# Patient Record
Sex: FEMALE | Race: WHITE | NOT HISPANIC OR LATINO | Employment: UNEMPLOYED | URBAN - METROPOLITAN AREA
[De-identification: names, ages, dates, MRNs, and addresses within clinical notes are randomized per-mention and may not be internally consistent; named-entity substitution may affect disease eponyms.]

---
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
## 2018-01-10 NOTE — MISCELLANEOUS
Message   Recorded as Task   Date: 01/13/2017 08:19 AM, Created By: Aaron Lynch   Task Name: Follow Up   Assigned To: [facility] Robertson Huddle   Regarding Patient: Chuck Neves, Status: Active   Comment:    Aaron Lynch - 13 Jan 2017 8:19 AM     TASK CREATED  Caller: Malathi Rodriguez, Parent; General Medical Question; [phone]  TC from pt's mother Malathi Rodriguez that the muscle relaxer for her daughter just makes her tired but is not helping to any extent  She said that you had discussed possible Chiropractic Care at consult  they are interested in trying that first instead of the [facility]  She is asking for names of Chiropractors that you would recommend  Marlo Silva I would make Dr Sole Chester aware and c/b with his rec concerning the Tizanidine and Chiropractor  ****Per Malathi Rodriguez it is ok to leave a detailed vm with  rec as she will be at work and doesn't want to play telephone tag  C/B # [id number]  ********   Sona Alex - 13 Jan 2017 8:49 AM     TASK REPLIED TO: Previously Assigned To [facility] clinical,Team  md aware   referral to Dr Saeid Arita in [address] - 13 Jan 2017 10:03 AM     TASK EDITED  Left detailed vm for pt's mother Malathi Rodriguez that Dr Sole Chester will refer Luca Mitchell to Dr Kingsley Jones, chiropractor  I provided his office # to call to make appt and said our office will mail them a copy of the referral to Dr Saeid Arita  Chiropractor referral placed in mail today  Any questions they are to contact the office  Active Problems    1  Back pain, chronic (724 5,338 29) (M54 9,G89 29)   2  Eczema (692 9) (L30 9)   3  Encounter for vision screening (V72 0) (Z01 00)   4  Flu vaccine need (V04 81) (Z23)   5  History of allergy (V15 09) (Z88 9)   6  HLA B27 positive (V84 89) (Z15 89)   7  Low back pain (724 2) (M54 5)   8  Muscle pain, myofacial (729 1) (M79 1)   9  Neck pain (723 1) (M54 2)   10  Need for Menactra vaccination (V03 89) (Z23)   11   Need for prophylactic vaccination and inoculation against influenza (V04 81) (Z23)   12  Oral contraceptive prescribed (V25 01) (Z30 011)   13  Right leg pain (729 5) (M79 604)   14  Sacroiliitis (720 2) (M46 1)   15  Shortness of breath (786 05) (R06 02)   16  Vitamin D deficiency (268 9) (E55 9)    Current Meds   1  Desonide 0 05 % External Ointment; APPLY SPARINGLY TO AFFECTED AREA(S)   TWICE DAILY; Therapy: 16Sys7458 to (Last Rx:50Knx0353)  Requested for: 67Ywp9595 Ordered   2  Ibuprofen 400 MG Oral Tablet; TAKE 1 TABLET BY MOUTH THREE TIMES DAILY AS   NEEDED; Therapy: 13BJC2482 to (Michelle Lyn)  Requested for: 46HFN8196; Last   Rx:20Ptu8410 Ordered   3  Lidocaine 5 % External Patch; APPLY 1 - 2 PATCHES AT PAINFUL AREA 12 HOURS ON   AND 12 HOURS OFF; Therapy: 30Grh7627 to (Evaluate:23Mar2017)  Requested for: 09Erl0114; Last   Rx:08Gya4353 Ordered   4  Lo Loestrin Fe 1 MG-10 MCG / 10 MCG Oral Tablet; TAKE 1 TABLET BY MOUTH EVERY   DAY AS DIRECTED; Therapy: 87Drn7386 to (Evaluate:25Mar2017)  Requested for: 24HQX3594; Last   Rx:10Rcu0048 Ordered   5  Multi-Vitamin Oral Tablet; TAKE 1 TABLET DAILY; Therapy: 14SIF6479 to Recorded   6  Ondansetron 4 MG Oral Tablet Dispersible; TAKE 1 TABLET Every 8 hours PRN nausea; Therapy: 53CKB5801 to (Evaluate:13Jun2016)  Requested for: 53ANS7485; Last   Rx:08Jun2016 Ordered   7  Pamprin All Day Relief Max St 220 MG Oral Tablet; TAKE 1 TABLET TWICE DAILY AS   NEEDED; Therapy: 67Iup4119 to (Evaluate:13Apr2013) Recorded   8  Sulfamethoxazole-Trimethoprim 800-160 MG Oral Tablet; TAKE 1 TABLET TWICE DAILY   UNTIL FINISHED; Therapy: 52IBY5342 to (Evaluate:26Eir2383)  Requested for: 88RFE5763; Last   Rx:13May2016 Ordered   9  Transderm-Scop (1 5 MG) 1 MG/3DAYS Transdermal Patch 72 Hour; APPLY 1 PATCH   EVERY 3 DAYS #2 APPLY MORE THEN 4 HOURS BEFORE EVENT; Therapy: 11DKO0586 to (Last Rx:08Jun2016)  Requested for: 09YFL4511 Ordered   10   Vitamin D 2000 UNIT Oral Capsule; TAKE 2 CAPSULE Daily; Therapy: (Recorded:12Qem0826) to Recorded    Allergies    1  No Known Drug Allergies    2  No Known Environmental Allergies   3   No Known Food Allergies    Signatures   Electronically signed by : Clearance Bosworth, ; Jan 13 2017 10:03AM EST                       (Author)

## 2018-01-10 NOTE — MISCELLANEOUS
Message   Recorded as Task   Date: 08/11/2017 12:59 PM, Created By: Remigio White   Task Name: Call Back   Assigned To: SPA clerical,Team   Regarding Patient: Chel Prasad, Status: In Progress   Trang Hercules - 11 Aug 2017 12:59 PM     TASK CREATED  Loumiguel angel Peña had an apt today with Dr Gabriella Jameson  She wanted to set up a trigger apt for her daughter Akila Riojas 7/31/98  Patient mom says Friday or Monday apt because daughter has school  thanks   Jackie Verduzco - 11 Aug 2017 2:51 PM     TASK EDITED      Please advise  ******************   Benigno Urbina - 11 Aug 2017 4:15 PM     TASK REPLIED TO: Previously Assigned To Benigno Urbina  schedule her for Monday 8/28 @ 3:30 pm please for TPI   Connie Oconnor - 11 Aug 2017 4:24 PM     TASK EDITED    I spoke to Marcos Peña, advised above  She said that is too soon and is looking for end of September, preferrably on a Friday  States that her daughter is really nervous and not even sure she is going to go thru with it  Advised I will discuss with Dr Gabriella Jameson and [facility] Monday  Marcos Peña thankful  ****************   Benigno Urbina - 14 Aug 2017 7:13 AM     TASK REPLIED TO: Previously Assigned To Benigno Urbina  ok to schedule at end of September on a Friday   Stacie Kohli - 14 Aug 2017 10:42 AM     TASK EDITED   Pati Griffith - 14 Aug 2017 11:47 AM     TASK IN PROGRESS   Ijeoma Edwards - 14 Aug 2017 12:04 PM     TASK EDITED  pt scheduled w dr Froilan Valdivia for tpi 9-29-17        Active Problems    1  Back pain, chronic (724 5,338 29) (M54 9,G89 29)   2  Eczema (692 9) (L30 9)   3  Encounter for health maintenance examination (V70 0) (Z00 00)   4  History of allergy (V15 09) (Z88 9)   5  HLA B27 positive (V84 89) (Z15 89)   6  Low back pain (724 2) (M54 5)   7  Muscle pain, myofacial (729 1) (M79 1)   8  Neck pain (723 1) (M54 2)   9  Oral contraceptive prescribed (V25 01) (Z30 011)   10  Vitamin D deficiency (268 9) (E55 9)    Current Meds   1   Desonide 0 05 % External Ointment; APPLY SPARINGLY TO AFFECTED AREA(S)   TWICE DAILY; Therapy: 50Jou7438 to (Last Rx:40Mem9085)  Requested for: 83Dxu0556 Ordered   2  Lidocaine 5 % External Ointment; Apply to affected areas 2 to 3 times a day as needed; Therapy: 04Nzj8800 to (Evaluate:07Gjo0247)  Requested for: 81Lsn9664; Last   Rx:76Cqn5233 Ordered   3  Lidocaine 5 % External Patch; APPLY 1 - 2 PATCHES AT PAINFUL AREA 12 HOURS ON   AND 12 HOURS OFF; Therapy: 09Jgb2315 to (Evaluate:23Mar2017)  Requested for: 36Vys3763; Last   Rx:55Qms3256 Ordered   4  Lo Loestrin Fe 1 MG-10 MCG / 10 MCG Oral Tablet; TAKE 1 TABLET BY MOUTH EVERY   DAY AS DIRECTED; Therapy: 23Gaz1656 to (Evaluate:05Wex7886)  Requested for: 44Leg0037; Last   Rx:44Mci7378 Ordered   5  Meloxicam 15 MG Oral Tablet; TAKE 1 TABLET DAILY WITH FOOD; Therapy: 51Ift2787 to (Dimple Squires)  Requested for: 02Aug2017; Last   Rx:02Aug2017 Ordered   6  Multi-Vitamin Oral Tablet; TAKE 1 TABLET DAILY; Therapy: 78LDL3022 to Recorded   7  Ondansetron 4 MG Oral Tablet Disintegrating; TAKE 1 TABLET Every 8 hours PRN   nausea; Therapy: 07RIA1818 to (Evaluate:13Jun2016)  Requested for: 51BMM8534; Last   Rx:08Jun2016 Ordered   8  Pamprin All Day Relief Max St 220 MG Oral Tablet; TAKE 1 TABLET TWICE DAILY AS   NEEDED; Therapy: 08Wuq1562 to (Evaluate:13Apr2013) Recorded   9  TiZANidine HCl - 4 MG Oral Tablet; TAKE 1 TABLET AT BEDTIME; Therapy: 06Nyi1546 to (Dimple Squires)  Requested for: 93Xmf7107; Last   Rx:02Aug2017 Ordered   10  Transderm-Scop (1 5 MG) 1 MG/3DAYS Transdermal Patch 72 Hour (Scopolamine);    APPLY 1 PATCH EVERY 3 DAYS #2 APPLY MORE THEN 4 HOURS    BEFORE EVENT; Therapy: 62BTC4379 to (Last Rx:17Qby6166)  Requested for: 02Aug2017 Ordered   11  Vitamin D 2000 UNIT Oral Capsule; TAKE 2 CAPSULE Daily; Therapy: (Recorded:14Bod8842) to Recorded    Allergies    1  No Known Drug Allergies    2  No Known Environmental Allergies   3   No Known Food Allergies    Signatures   Electronically signed by : Malia Brenner, ; Aug 14 2017 12:06PM EST                       (Author)

## 2018-01-10 NOTE — MISCELLANEOUS
Message   Recorded as Task   Date: 03/22/2017 01:20 PM, Created By: Damion Don   Task Name: Med Renewal Request   Assigned To: [facility] clinical,Team   Regarding Patient: Summer Jung, Status: Active   Comment:    Rosana Martinez - 22 Mar 2017 1:20 PM     TASK CREATED  Caller: Mother, Parent; Renew Medication; [phone]  Mother called requesting a refill of tizanidine 4mg, she does not have the bottle in front of her but believes it is bid, per allscripts it is once at [facility]  Pt has an sovs w/Vidhya on 4/17/17  Mother aware Jose Grandchild will address on 3/23 when she is in the office, pt has enough for 2 days  [facility] - 23 Mar 2017 12:29 PM     TASK REPLIED TO: Previously Assigned To CELY Haney  sent to pharmacy   Rosana Martinez - 23 Mar 2017 1:32 PM     TASK EDITED  Left detailed message as per release of information  Pt/mom to call w/ any questions  Active Problems    1  Back pain, chronic (724 5,338 29) (M54 9,G89 29)   2  Eczema (692 9) (L30 9)   3  Encounter for vision screening (V72 0) (Z01 00)   4  Flu vaccine need (V04 81) (Z23)   5  History of allergy (V15 09) (Z88 9)   6  HLA B27 positive (V84 89) (Z15 89)   7  Low back pain (724 2) (M54 5)   8  Muscle pain, myofacial (729 1) (M79 1)   9  Neck pain (723 1) (M54 2)   10  Need for Menactra vaccination (V03 89) (Z23)   11  Need for prophylactic vaccination and inoculation against influenza (V04 81) (Z23)   12  Oral contraceptive prescribed (V25 01) (Z30 011)   13  Right leg pain (729 5) (M79 604)   14  Sacroiliitis (720 2) (M46 1)   15  Shortness of breath (786 05) (R06 02)   16  Vitamin D deficiency (268 9) (E55 9)    Current Meds   1  Desonide 0 05 % External Ointment; APPLY SPARINGLY TO AFFECTED AREA(S)   TWICE DAILY; Therapy: 06Ryo3031 to (Last Rx:07Une1482)  Requested for: 26Glx5494 Ordered   2   Ibuprofen 400 MG Oral Tablet; TAKE 1 TABLET BY MOUTH THREE TIMES DAILY

## 2018-01-11 NOTE — RESULT NOTES
Verified Results  (1) HLA B27 66RKR8827 04:06PM [facility] Order Number: [id number]     Test Name Result Flag Reference   HLA B27 Positive     HLA-B*27 PositiveThis patient is positive for HLA-B*27  This procedure rulesout the B*27:06 and 27:09 alleles, which the literaturesuggests are not associated with spondyloarthropathies  HLA allele interpretation for all loci based on IMGT/HLAdatabase version 3 [facility] [place] ID Number [id number] test was performed using PCR (Polymerase Chain Reaction)/SSOP(Sequence Specific Oligonucleotide Probes) technique  SBT (SequenceBased Typing) and/or SSP (Sequence Specific Primers) may be used assupplemental methods when necessary  Please contact HLA CustomerService at [phone] if you have any questions   Director of [facility] Dr Vic Wolfe, PhD    Performed at:  [address]  [id number]  : Paris Schroeder PhD, Phone:  [phone]

## 2018-01-11 NOTE — RESULT NOTES
Verified Results  (1) RHEUMATOID FACTOR SCREEN 45AIB1726 04:06PM Neema Perkins Order Number: [id number]     Test Name Result Flag Reference   RHEUMATOID FACTOR Negative  Negative       Discussion/Summary   RHEUMATOID NEGATIVE     Pippa Rudd

## 2018-01-11 NOTE — RESULT NOTES
Verified Results  (1) CBC/PLT/DIFF 24VZO8891 04:06PM Roxine Cellrox Order Number: [id number]  TW Order Number: [id number]     Test Name Result Flag Reference   WBC COUNT 6 96 Thousand/uL  4 31-10 16   RBC COUNT 4 94 Million/uL  3 81-5 12   HEMOGLOBIN 14 9 g/dL  11 5-15 4   HEMATOCRIT 42 4 %  34 8-46  1   MCV 86 fL  82-98   MCH 30 2 pg  26 8-34 3   MCHC 35 1 g/dL  31 4-37 4   RDW 11 7 %  11 6-15 1   MPV 9 1 fL  8 9-12 7   PLATELET COUNT 870 Thousands/uL  149-390   NEUTROPHILS RELATIVE PERCENT 58 %  43-75   LYMPHOCYTES RELATIVE PERCENT 31 %  14-44   MONOCYTES RELATIVE PERCENT 6 %  4-12   EOSINOPHILS RELATIVE PERCENT 4 %  0-6   BASOPHILS RELATIVE PERCENT 1 %  0-1   NEUTROPHILS ABSOLUTE COUNT 4 06 Thousands/?L  1 85-7 62   LYMPHOCYTES ABSOLUTE COUNT 2 17 Thousands/?L  0 60-4 47   MONOCYTES ABSOLUTE COUNT 0 39 Thousand/?L  0 17-1 22   EOSINOPHILS ABSOLUTE COUNT 0 28 Thousand/?L  0 00-0 61   BASOPHILS ABSOLUTE COUNT 0 06 Thousands/?L  0 00-0 10     (1) C-REACTIVE PROTEIN 00UZQ8160 04:06PM Roxine Cellrox Order Number: [id number]  TW Order Number: [id number]     Test Name Result Flag Reference   C-REACT PROTEIN 3 2 mg/L H <3 0     (1) SED RATE [id number] 04:06PM Roxine Cellrox Order Number: [id number]     Test Name Result Flag Reference   SED RATE 9 mm/hour  0-20     (1) VITAMIN D 25-HYDROXY 65PQM7954 04:06PM Roxine Cellrox Order Number: [id number]  TW Order Number: [id number]     Test Name Result Flag Reference   VIT D 25-HYDROX 26 8 ng/mL L 30 0-100 0

## 2018-01-12 NOTE — PROGRESS NOTES
Assessment    1  Encounter for preventive health examination (V70 0) (Z00 00)   2  Never a smoker    Plan  Muscle pain, myofacial    · TiZANidine HCl - 4 MG Oral Tablet; TAKE 1 TABLET AT BEDTIME  Oral contraceptive prescribed    · Lo Loestrin Fe 1 MG-10 MCG / 10 MCG Oral Tablet; TAKE 1 TABLET BY MOUTH  EVERY DAY AS DIRECTED  PMH: History of inflammation of sacroiliac joint    · Meloxicam 15 MG Oral Tablet; TAKE 1 TABLET DAILY WITH FOOD  PMH: Motion sickness, initial encounter    · Transderm-Scop (1 5 MG) 1 MG/3DAYS Transdermal Patch 72 Hour  (Scopolamine); APPLY 1 PATCH EVERY 3 DAYS #2 APPLY MORE THEN 4  HOURS BEFORE EVENT    Discussion/Summary  health maintenance visit Currently, she eats a healthy diet  the risks and benefits of cervical cancer screening were discussed cervical cancer screening is not indicated Breast cancer screening: the risks and benefits of breast cancer screening were discussed and breast cancer screening is not indicated  Colorectal cancer screening: the risks and benefits of colorectal cancer screening were discussed and colorectal cancer screening is not indicated  Osteoporosis screening: the risks and benefits of osteoporosis screening were discussed, bone mineral density testing is not indicated and bone mineral density testing is managed by  Advice and education were given regarding weight loss, sunscreen use and seat belt use  Patient discussion: discussed with the patient  Possible side effects of new medications were reviewed with the patient/guardian today  The treatment plan was reviewed with the patient/guardian  The patient/guardian understands and agrees with the treatment plan      Chief Complaint  Patient presents to office with mother for a health maintenance exam       History of Present Illness  HM, Adult Female: The patient is being seen for a health maintenance evaluation  Social History: Household members include mother and aunt, uncle and 2 cousins   She is unmarried  Work status: student  The patient has never smoked cigarettes  She reports never drinking alcohol  She has never used illicit drugs  General Health: The patient's health since the last visit is described as good  She has regular dental visits  Lifestyle:  She consumes a diverse and healthy diet  She does not have any weight concerns  She exercises regularly  She does not use tobacco  She denies alcohol use  She denies drug use  Reproductive health:  she reports normal menses  she uses no contraception  she is not sexually active  she denies prior pregnancies  Screening: Cervical cancer screening includes no previous pap smear, no previous human papilloma virus screening and no previous colposcopy  Breast cancer screening includes no previous mammogram, no previous clinical breast exam and no breast self-exams  She hasn't been previously screened for colorectal cancer  Metabolic screening includes lipid profile performed within the past five years, glucose screening performed last year, thyroid function test performed last year and no previous DEXA  Safety elements used: seat belt and smoke detector  HPI: here for wellness exam      Review of Systems    Constitutional: No fever, no chills, feels well, no tiredness, no recent weight gain or weight loss  Eyes: No complaints of eye pain, no red eyes, no eyesight problems, no discharge, no dry eyes, no itching of eyes  ENT: no complaints of earache, no loss of hearing, no nose bleeds, no nasal discharge, no sore throat, no hoarseness  Cardiovascular: No complaints of slow heart rate, no fast heart rate, no chest pain, no palpitations, no leg claudication, no lower extremity edema  Respiratory: No complaints of shortness of breath, no wheezing, no cough, no SOB on exertion, no orthopnea, no PND  Gastrointestinal: No complaints of abdominal pain, no constipation, no nausea or vomiting, no diarrhea, no bloody stools     Genitourinary: No complaints of dysuria, no incontinence, no pelvic pain, no dysmenorrhea, no vaginal discharge or bleeding  Musculoskeletal: No complaints of arthralgias, no myalgias, no joint swelling or stiffness, no limb pain or swelling  Integumentary: No complaints of skin rash or lesions, no itching, no skin wounds, no breast pain or lump  Neurological: No complaints of headache, no confusion, no convulsions, no numbness, no dizziness or fainting, no tingling, no limb weakness, no difficulty walking  Psychiatric: Not suicidal, no sleep disturbance, no anxiety or depression, no change in personality, no emotional problems  Endocrine: No complaints of proptosis, no hot flashes, no muscle weakness, no deepening of the voice, no feelings of weakness  Hematologic/Lymphatic: No complaints of swollen glands, no swollen glands in the neck, does not bleed easily, does not bruise easily  Active Problems    1  Back pain, chronic (724 5,338 29) (M54 9,G89 29)   2  Eczema (692 9) (L30 9)   3  History of allergy (V15 09) (Z88 9)   4  HLA B27 positive (V84 89) (Z15 89)   5  Low back pain (724 2) (M54 5)   6  Muscle pain, myofacial (729 1) (M79 1)   7  Neck pain (723 1) (M54 2)   8  Oral contraceptive prescribed (V25 01) (Z30 011)   9   Vitamin D deficiency (268 9) (E55 9)    Past Medical History    · History of Acute back pain (724 5) (M54 9)   · History of Acute UTI (599 0) (N39 0)   · History of Encounter for vision screening (V72 0) (Z01 00)   · History of dysmenorrhea (V13 29) (Z87 42)   · History of eczema (V13 3) (Z87 2)   · History of inflammation of sacroiliac joint (V13 4) (Z87 39)   · History of influenza vaccination (V49 89) (Z92 29)   · History of influenza vaccination (V49 89) (Z92 29)   · History of shortness of breath (V13 89) (Z16 683)   · History of urinary frequency (V13 09) (Z87 898)   · History of Motion sickness, initial encounter (994 6) (T75 3XXA)   · History of Need for Menactra vaccination (V03 89) (Z23)   · History of Pain, upper back (724 5) (M54 9)   · History of Pneumonia (V12 61)   · History of Right leg pain (729 5) (M79 604)    Family History  Mother    · Family history of Family Health Status Of Mother - Alive  Father    · Family history of Family Health Status Of Father - Alive   · Family history of Hypertension (V17 49)   · Family history of Pure Hypercholesterolemia  Paternal Grandmother    · Family history of Asthma (V17 5)  Family History    · Family history of Arthritis (716 90) (M19 90)    Social History    · Denied: History of Being A Social Drinker   · Daily caffeinated cola consumption   · Educational Level - Grade 8 Completed   · Never a smoker    Current Meds   1  Desonide 0 05 % External Ointment; APPLY SPARINGLY TO AFFECTED AREA(S)   TWICE DAILY; Therapy: 39Cjh3185 to (Last Rx:37Pre3209)  Requested for: 91Yrh4444 Ordered   2  Lidocaine 5 % External Ointment; Apply to affected areas 2 to 3 times a day as needed; Therapy: 62Pux2758 to (Evaluate:57Ibw0200)  Requested for: 50Cyg5672; Last   Rx:17Apr2017 Ordered   3  Lidocaine 5 % External Patch; APPLY 1 - 2 PATCHES AT PAINFUL AREA 12 HOURS ON   AND 12 HOURS OFF; Therapy: 88Lfm3420 to (Evaluate:23Mar2017)  Requested for: 73Jbc1301; Last   Rx:57Vht8402 Ordered   4  Lo Loestrin Fe 1 MG-10 MCG / 10 MCG Oral Tablet; TAKE 1 TABLET BY MOUTH EVERY   DAY AS DIRECTED; Therapy: 65Vls8779 to (Evaluate:86Sem7707)  Requested for: 99OLN7606; Last   DV:41NYH4852 Ordered   5  Meloxicam 15 MG Oral Tablet; TAKE 1 TABLET DAILY WITH FOOD; Therapy: 68Myz8266 to (Evaluate:09Zfo4504)  Requested for: 23Yjy2827; Last   Rx:72Ymx5135 Ordered   6  Multi-Vitamin Oral Tablet; TAKE 1 TABLET DAILY; Therapy: 94ANC2491 to Recorded   7  Ondansetron 4 MG Oral Tablet Disintegrating; TAKE 1 TABLET Every 8 hours PRN   nausea; Therapy: 63FWT7502 to (Evaluate:13Jun2016)  Requested for: 25JCU7031; Last   Rx:08Jun2016 Ordered   8   Pamprin All Day Relief Max St 220 MG Oral Tablet; TAKE 1 TABLET TWICE DAILY AS   NEEDED; Therapy: 88Mmv7477 to (Evaluate:13Apr2013) Recorded   9  TiZANidine HCl - 4 MG Oral Tablet; TAKE 1 TABLET AT BEDTIME; Therapy: 26Qdq1750 to (Evaluate:21Jun2017)  Requested for: 35CLY3122; Last   Rx:23Mar2017 Ordered   10  Transderm-Scop (1 5 MG) 1 MG/3DAYS Transdermal Patch 72 Hour; APPLY 1 PATCH    EVERY 3 DAYS #2 APPLY MORE THEN 4 HOURS BEFORE EVENT; Therapy: 77SKF1795 to (Last Rx:08Jun2016)  Requested for: 95PIM9234 Ordered   11  Vitamin D 2000 UNIT Oral Capsule; TAKE 2 CAPSULE Daily; Therapy: (Recorded:19Cng8059) to Recorded    Allergies    1  No Known Drug Allergies    2  No Known Environmental Allergies   3  No Known Food Allergies    Vitals   Recorded: 07Hhg1749 03:27PM   Heart Rate 88   Respiration 16   Systolic 585   Diastolic 80   Height 5 ft 4 02 in   Weight 136 lb 12 8 oz   BMI Calculated 23 47   BSA Calculated 1 66   BMI Percentile 69 %   2-20 Stature Percentile 46 %   2-20 Weight Percentile 67 %     Physical Exam    Constitutional   General appearance: No acute distress, well appearing and well nourished  Head and Face   Head and face: Normal     Eyes   Conjunctiva and lids: No swelling, erythema or discharge  Pupils and irises: Equal, round, reactive to light  Ears, Nose, Mouth, and Throat   External inspection of ears and nose: Normal     Otoscopic examination: Tympanic membranes translucent with normal light reflex  Canals patent without erythema  Nasal mucosa, septum, and turbinates: Normal without edema or erythema  Lips, teeth, and gums: Normal, good dentition  Oropharynx: Normal with no erythema, edema, exudate or lesions  Neck   Neck: Supple, symmetric, trachea midline, no masses  Thyroid: Normal, no thyromegaly  Pulmonary   Respiratory effort: No increased work of breathing or signs of respiratory distress  Auscultation of lungs: Clear to auscultation      Cardiovascular   Auscultation of heart: Normal rate and rhythm, normal S1 and S2, no murmurs  Examination of extremities for edema and/or varicosities: Normal     Abdomen   Abdomen: Non-tender, no masses  Liver and spleen: No hepatomegaly or splenomegaly  Lymphatic   Palpation of lymph nodes in neck: No lymphadenopathy  Palpation of lymph nodes in axillae: No lymphadenopathy  Musculoskeletal   Gait and station: Normal     Digits and nails: Normal without clubbing or cyanosis  Joints, bones, and muscles: Normal     Range of motion: Normal     Stability: Normal     Muscle strength/tone: Normal     Skin   Skin and subcutaneous tissue: Normal without rashes or lesions  Palpation of skin and subcutaneous tissue: Normal turgor  Neurologic   Cranial nerves: Cranial nerves II-XII intact  Cortical function: Normal mental status  Reflexes: 2+ and symmetric  Sensation: No sensory loss  Coordination: Normal finger to nose and heel to shin  Psychiatric   Judgment and insight: Normal     Orientation to person, place, and time: Normal     Recent and remote memory: Intact  Mood and affect: Normal        Results/Data  PHQ-2 Adult Depression Screening 98Dtf9397 03:25PM User, s     Test Name Result Flag Reference   PHQ-2 Adult Depression Score 0     Over the last two weeks, how often have you been bothered by any of the following problems? Little interest or pleasure in doing things: Not at all - 0  Feeling down, depressed, or hopeless: Not at all - 0   PHQ-2 Adult Depression Screening Negative         Signatures   Electronically signed by : IOANA Boyce;  Aug  2 2017  7:01PM EST                       (Author)    Electronically signed by : Jennifer Andrade MD; Aug  3 2017  9:44AM EST                       (Author)

## 2018-01-12 NOTE — RESULT NOTES
Verified Results  * XR SPINE CERVICAL 2 OR 3 VIEW 42DYK8806 03:58PM Kalen Rodarte Order Number: [id number]     Test Name Result Flag Reference   XR SPINE CERVICAL 2 OR 3 VW (Report)     CERVICAL SPINE     INDICATION: Neck pain  COMPARISON: None     VIEWS: AP, lateral and open mouth projections; 4 images     FINDINGS:     No evidence of fracture or subluxation  The intervertebral disc spaces are preserved  The prevertebral soft tissues are within normal limits  The lung apices are intact  IMPRESSION:     Unremarkable cervical spine         Workstation performed: [id number]     Signed by:   Moon Aleman MD   5/4/16

## 2018-01-13 VITALS
BODY MASS INDEX: 23.35 KG/M2 | HEART RATE: 88 BPM | SYSTOLIC BLOOD PRESSURE: 110 MMHG | WEIGHT: 136.8 LBS | DIASTOLIC BLOOD PRESSURE: 80 MMHG | HEIGHT: 64 IN | RESPIRATION RATE: 16 BRPM

## 2018-01-13 NOTE — RESULT NOTES
Verified Results  (1) GRACIA SCREEN W/REFLEX TO TITER/PATTERN 55WDT7908 04:06PM Joseline Shows Order Number: [id number]     Test Name Result Flag Reference   GRACIA SCREEN  Negative  Negative       Discussion/Summary   LUPUS SCREEN IS NORMAL      [facility] [place]

## 2018-01-13 NOTE — MISCELLANEOUS
Message   Recorded as Task   Date: 03/21/2017 11:31 AM, Created By: Kristina Barragan   Task Name: Miscellaneous   Assigned To: Kristina Barragan   Regarding Patient: Rufina Van, Status: Active   CommentCheril Servando - 21 Mar 2017 11:31 AM     TASK CREATED  pt rescheduled her [address] tomorrow   Waqas Ewing - 21 Mar 2017 11:43 AM     TASK REPLIED TO: Previously Assigned To Waqas Ewing md aware     Signatures   Electronically signed by :  Ahmet Martell, ; Mar 22 2017  8:05AM EST                       (Author)

## 2018-01-14 VITALS
HEIGHT: 64 IN | HEART RATE: 96 BPM | WEIGHT: 135 LBS | BODY MASS INDEX: 23.05 KG/M2 | DIASTOLIC BLOOD PRESSURE: 74 MMHG | RESPIRATION RATE: 20 BRPM | SYSTOLIC BLOOD PRESSURE: 118 MMHG

## 2018-01-15 NOTE — PROGRESS NOTES
Chief Complaint  pt here for flu shot      Active Problems    1  Acute back pain (724 5) (M54 9)   2  Acute UTI (599 0) (N39 0)   3  Back pain, chronic (724 5,338 29) (M54 9,G89 29)   4  Eczema (692 9) (L30 9)   5  Encounter for vision screening (V72 0) (Z01 00)   6  History of allergy (V15 09) (Z88 9)   7  Low back pain (724 2) (M54 5)   8  Motion sickness, initial encounter (994 6) (T75 3XXA)   9  Neck pain (723 1) (M54 2)   10  Need for Menactra vaccination (V03 89) (Z23)   11  Need for prophylactic vaccination and inoculation against influenza (V04 81) (Z23)   12  Oral contraceptive prescribed (V25 01) (Z30 011)   13  Right leg pain (729 5) (M79 604)   14  Shortness of breath (786 05) (R06 02)   15  Urinary frequency (788 41) (R35 0)   16  Vitamin D deficiency (268 9) (E55 9)    Current Meds   1  Desonide 0 05 % External Ointment; APPLY SPARINGLY TO AFFECTED AREA(S)   TWICE DAILY; Therapy: 75Csd0263 to (Last Rx:42Dkg8632)  Requested for: 08Sug5038 Ordered   2  Ibuprofen 400 MG Oral Tablet; TAKE 1 TABLET BY MOUTH THREE TIMES DAILY AS   NEEDED; Therapy: 17PRW3187 to (21 )  Requested for: 82MLK1951; Last   Rx:22Cni7869 Ordered   3  Lo Loestrin Fe 1 MG-10 MCG / 10 MCG Oral Tablet; TAKE 1 TABLET BY MOUTH EVERY   DAY AS DIRECTED; Therapy: 68Smz9902 to (Evaluate:64Fli5132)  Requested for: 27Jun2016; Last   Rx:27Jun2016 Ordered   4  Metaxalone 800 MG Oral Tablet; TAKE 1 TABLET 3 TIMES DAILY AS NEEDED FOR   MUSCLE SPASM; Therapy: 82RIX4388 to (Evaluate:22Pwa2109)  Requested for: 81LJU9875; Last   Rx:38Rzc8715 Ordered   5  Multi-Vitamin Oral Tablet; TAKE 1 TABLET DAILY; Therapy: 07ZRG7570 to Recorded   6  Ondansetron 4 MG Oral Tablet Dispersible; TAKE 1 TABLET Every 8 hours PRN nausea; Therapy: 79HNF4748 to (Evaluate:13Jun2016)  Requested for: 69SDC1234; Last   Rx:08Jun2016 Ordered   7  Pamprin All Day Relief Max St 220 MG Oral Tablet; TAKE 1 TABLET TWICE DAILY AS   NEEDED;    Therapy: 92GEQ2916 to (Evaluate:13Apr2013) Recorded   8  Sulfamethoxazole-Trimethoprim 800-160 MG Oral Tablet; TAKE 1 TABLET TWICE DAILY   UNTIL FINISHED; Therapy: 11MCN0810 to (Evaluate:18May2016)  Requested for: 50OFV8658; Last   Rx:13May2016 Ordered   9  Transderm-Scop (1 5 MG) 1 MG/3DAYS Transdermal Patch 72 Hour; APPLY 1 PATCH   EVERY 3 DAYS #2 APPLY MORE THEN 4 HOURS BEFORE EVENT; Therapy: 18ROQ9137 to (Last Rx:08Jun2016)  Requested for: 04QXZ6993 Ordered   10  Vitamin D 2000 UNIT Oral Capsule; TAKE 2 CAPSULE Daily; Therapy: (Recorded:06May2016) to Recorded    Allergies    1  No Known Drug Allergies    2  No Known Environmental Allergies   3   No Known Food Allergies    Plan  Flu vaccine need    · Fluzone Quadrivalent 0 5 ML Intramuscular Suspension Prefilled Syringe    Signatures   Electronically signed by : Nataliya Wiley DO; Nov 8 2016  4:26PM EST                       (Author)

## 2018-01-15 NOTE — RESULT NOTES
Verified Results  (1) URINE CULTURE 36XIO8969 07:52PM Richa Lambert     Test Name Result Flag Reference   CLINICAL REPORT (Report)     Test:        Urine culture  Specimen Type:   Urine  Specimen Date:   5/13/2016 7:52 PM  Result Date:    5/14/2016 5:45 PM  Result Status:   Final result  Resulting Lab:    [address] [address]            Tel: [phone]      CULTURE                                       ------------------                                   No Growth <1000 cfu/mL       Signatures   Electronically signed by : IOANA Mart; May 16 2016  6:26AM EST                       (Author)

## 2018-01-15 NOTE — RESULT NOTES
Message   Recorded as Task   Date: 05/19/2017 11:15 AM, Created By: Shola Mcgowan   Task Name: Follow Up   Assigned To: Pavel Jose   Regarding Patient: Елена Richardson, Status: Active   Comment:    SeunStacie - 19 May 2017 11:15 AM     TASK CREATED  Results Inquiry  Received a call from Sammie's mother stating her daughter tried to get the [place] filled and it is not covered by their insurance and they can't afford the $200  Do you think she should go back on the Tizanidine? FQ to advise  sophia Beckman - 19 May 2017 12:31 PM     TASK REPLIED TO: Previously Assigned To SPA Ivan Najjar  will try the generic   Stacie Kohli - 19 May 2017 2:27 PM     TASK EDITED  S/w mother Ranjana Yu and she was made a ware          Signatures   Electronically signed by : Nancy Galvan, ; May 19 2017  2:28PM EST                       (Author)

## 2018-01-16 NOTE — MISCELLANEOUS
Message     Recorded as Task   Date: 04/17/2017 03:48 PM, Created By: Paz Wright   Task Name: Follow Up   Assigned To: CELY Valencia   Regarding Patient: Héctor North, Status: Active   Comment:    Lili Werner - 17 Apr 2017 3:48 PM     TASK CREATED  Caller: Frank Fernandez, Parent; General Medical Question  **FYI**    TC from pt's mother stating that their [facility] contacted them that the meloxicam was ready but said they didn't get any order for Lidocaine oint from our office when Mainor Black inquired about it  They [facility] on [address]  Melva Tierney I would look into and call their [facility] with verbal clarification  Per allscripts the lidocaine oint order reads verification of transmission from 11:31 this AM, s/w female pharmacist at [facility] who said order wasn't received on their end  I gave order verbally to pharmacist for the lidocaine oint just as NM ordered it in allscript  Vidhya Mckeon - 17 Apr 2017 5:06 PM     TASK REPLIED TO: Previously Assigned To Jacky Alfaro clinical,Team  thanks so much        Active Problems    1  Back pain, chronic (724 5,338 29) (M54 9,G89 29)   2  Eczema (692 9) (L30 9)   3  Encounter for vision screening (V72 0) (Z01 00)   4  Flu vaccine need (V04 81) (Z23)   5  History of allergy (V15 09) (Z88 9)   6  HLA B27 positive (V84 89) (Z15 89)   7  Low back pain (724 2) (M54 5)   8  Muscle pain, myofacial (729 1) (M79 1)   9  Neck pain (723 1) (M54 2)   10  Need for Menactra vaccination (V03 89) (Z23)   11  Need for prophylactic vaccination and inoculation against influenza (V04 81) (Z23)   12  Oral contraceptive prescribed (V25 01) (Z30 011)   13  Right leg pain (729 5) (M79 604)   14  Sacroiliitis (720 2) (M46 1)   15  Shortness of breath (786 05) (R06 02)   16  Vitamin D deficiency (268 9) (E55 9)    Current Meds   1  Desonide 0 05 % External Ointment; APPLY SPARINGLY TO AFFECTED AREA(S)   TWICE DAILY;    Therapy: 14PGH5149 to (Last Rx:24Feb2015) Requested for: 82Rbz7011 Ordered   2  Lidocaine 5 % External Ointment; Apply to affected areas 2 to 3 times a day as needed; Therapy: 99Uuh1397 to (Evaluate:66Bnf4850)  Requested for: 86Qua5033; Last   Rx:07Qpg9159 Ordered   3  Lidocaine 5 % External Patch; APPLY 1 - 2 PATCHES AT PAINFUL AREA 12 HOURS ON   AND 12 HOURS OFF; Therapy: 76Dfh9876 to (Evaluate:23Mar2017)  Requested for: 63Yti7486; Last   Rx:10Qds0671 Ordered   4  Lo Loestrin Fe 1 MG-10 MCG / 10 MCG Oral Tablet; TAKE 1 TABLET BY MOUTH EVERY   DAY AS DIRECTED; Therapy: 45Lnh7201 to (Evaluate:30Apr2017)  Requested for: 02Apr2017; Last   Rx:02Apr2017 Ordered   5  Meloxicam 15 MG Oral Tablet; TAKE 1 TABLET DAILY WITH FOOD; Therapy: 82Qcj4729 to (Evaluate:83Pce7522)  Requested for: 61Sjf2048; Last   Rx:60Tjz5779 Ordered   6  Multi-Vitamin Oral Tablet; TAKE 1 TABLET DAILY; Therapy: 39KMD3532 to Recorded   7  Ondansetron 4 MG Oral Tablet Dispersible; TAKE 1 TABLET Every 8 hours PRN nausea; Therapy: 90ETO9884 to (Evaluate:13Jun2016)  Requested for: 23UFM8476; Last   Rx:08Jun2016 Ordered   8  Pamprin All Day Relief Max St 220 MG Oral Tablet; TAKE 1 TABLET TWICE DAILY AS   NEEDED; Therapy: 25Nxp5898 to (Evaluate:13Apr2013) Recorded   9  Sulfamethoxazole-Trimethoprim 800-160 MG Oral Tablet; TAKE 1 TABLET TWICE DAILY   UNTIL FINISHED; Therapy: 93TGA0619 to (Evaluate:04Pqi4850)  Requested for: 92LZH1565; Last   Rx:72Slh2671 Ordered   10  TiZANidine HCl - 4 MG Oral Tablet; TAKE 1 TABLET AT BEDTIME; Therapy: 37Aut5679 to (Evaluate:21Jun2017)  Requested for: 15IKP4286; Last    Rx:23Mar2017 Ordered   11  Transderm-Scop (1 5 MG) 1 MG/3DAYS Transdermal Patch 72 Hour; APPLY 1 PATCH    EVERY 3 DAYS #2 APPLY MORE THEN 4 HOURS BEFORE EVENT; Therapy: 72BDY5850 to (Last Rx:08Jun2016)  Requested for: 30UIB4710 Ordered   12  Vitamin D 2000 UNIT Oral Capsule; TAKE 2 CAPSULE Daily; Therapy: (Recorded:64Wlf3707) to Recorded    Allergies    1   No Known Drug Allergies    2  No Known Environmental Allergies   3   No Known Food Allergies    Signatures   Electronically signed by : Delio Amador, ; Apr 18 2017  7:37AM EST                       (Author)

## 2018-01-16 NOTE — MISCELLANEOUS
Message   Recorded as Task   Date: 02/22/2017 09:27 AM, Created By: Marianna Martines   Task Name: Miscellaneous   Assigned To: Marianna Martines   Regarding Patient: Sukh Monae, Status: Active   Comment:    Marianna Martines - 22 Feb 2017 9:27 AM     TASK CREATED  Caller: Self; [phone] (Home); [phone] (Work)  Patient and her mom called on speaker today  They flip flopped there appts  The daughter got a tattoo on her abdomen and can't have any clothing touching in bc it hurts too much  (Just an Dwight Pointer - 22 Feb 2017 10:21 AM     TASK REPLIED TO: Previously Assigned To Heron [facility] md aware        Active Problems    1  Back pain, chronic (724 5,338 29) (M54 9,G89 29)   2  Eczema (692 9) (L30 9)   3  Encounter for vision screening (V72 0) (Z01 00)   4  Flu vaccine need (V04 81) (Z23)   5  History of allergy (V15 09) (Z88 9)   6  HLA B27 positive (V84 89) (Z15 89)   7  Low back pain (724 2) (M54 5)   8  Muscle pain, myofacial (729 1) (M79 1)   9  Neck pain (723 1) (M54 2)   10  Need for Menactra vaccination (V03 89) (Z23)   11  Need for prophylactic vaccination and inoculation against influenza (V04 81) (Z23)   12  Oral contraceptive prescribed (V25 01) (Z30 011)   13  Right leg pain (729 5) (M79 604)   14  Sacroiliitis (720 2) (M46 1)   15  Shortness of breath (786 05) (R06 02)   16  Vitamin D deficiency (268 9) (E55 9)    Current Meds   1  Desonide 0 05 % External Ointment; APPLY SPARINGLY TO AFFECTED AREA(S)   TWICE DAILY; Therapy: 37Sha5599 to (Last Rx:61Ayd8181)  Requested for: 86Irv6833 Ordered   2  Ibuprofen 400 MG Oral Tablet; TAKE 1 TABLET BY MOUTH THREE TIMES DAILY AS   NEEDED; Therapy: 57JRD7920 to (96 [id number])  Requested for: 13QHJ7471; Last   Rx:47Pri1158 Ordered   3  Lidocaine 5 % External Patch; APPLY 1 - 2 PATCHES AT PAINFUL AREA 12 HOURS ON   AND 12 HOURS OFF;    Therapy: 03Ona1348 to (Evaluate:23Mar2017)  Requested for: 23Dec2016; Last   Rx:23Dec2016 Ordered 4  Lo Loestrin Fe 1 MG-10 MCG / 10 MCG Oral Tablet; TAKE 1 TABLET BY MOUTH EVERY   DAY AS DIRECTED; Therapy: 57Aaa7566 to (Evaluate:25Mar2017)  Requested for: 89XNF5757; Last   Rx:46Wvc2297 Ordered   5  Multi-Vitamin Oral Tablet; TAKE 1 TABLET DAILY; Therapy: 50IKX4377 to Recorded   6  Ondansetron 4 MG Oral Tablet Dispersible; TAKE 1 TABLET Every 8 hours PRN nausea; Therapy: 05TTF4661 to (Evaluate:13Jun2016)  Requested for: 97WXX6193; Last   Rx:08Jun2016 Ordered   7  Pamprin All Day Relief Max St 220 MG Oral Tablet; TAKE 1 TABLET TWICE DAILY AS   NEEDED; Therapy: 71Pqa7148 to (Evaluate:13Apr2013) Recorded   8  Sulfamethoxazole-Trimethoprim 800-160 MG Oral Tablet; TAKE 1 TABLET TWICE DAILY   UNTIL FINISHED; Therapy: 93SDC4449 to (Evaluate:18May2016)  Requested for: 82LHG3367; Last   Rx:13May2016 Ordered   9  Transderm-Scop (1 5 MG) 1 MG/3DAYS Transdermal Patch 72 Hour; APPLY 1 PATCH   EVERY 3 DAYS #2 APPLY MORE THEN 4 HOURS BEFORE EVENT; Therapy: 74TWZ9739 to (Last Rx:08Jun2016)  Requested for: 81NMC5151 Ordered   10  Vitamin D 2000 UNIT Oral Capsule; TAKE 2 CAPSULE Daily; Therapy: (Recorded:23Fij6342) to Recorded    Allergies    1  No Known Drug Allergies    2  No Known Environmental Allergies   3   No Known Food Allergies    Signatures   Electronically signed by : Airam Bowling, ; Feb 22 2017 10:47AM EST                       (Author)

## 2018-01-16 NOTE — MISCELLANEOUS
Message   Recorded as Task   Date: 05/10/2017 12:25 PM, Created By: Elijah Quiroga   Task Name: Miscellaneous   Assigned To: Abbie Causey clinical,Team   Regarding Patient: Deanne Blanco, Status: Active   Comment:    DeanindioCarlota hammond - 10 May 2017 12:25 PM     TASK CREATED  Pt's mother Wendy Epps called stating pt was given Lorzone 750mg to take and let the office know if they would like a script  Wendy Epps said they would like a script sent to [facility] (she said [facility] location is [facility] chart)  Wendy Epps can be reached at [phone]  [facility] - 10 May 2017 1:03 PM     TASK EDITED  I confirmed with pt's mother that her daughter was taking lorzone 750mg 1/2 tab during the day and full tab QHS  She took the last pill this morning and has non left for tonight  Asking for RX be sent to [facility] on [address] on file today  ****She said their [facility] will contact them once the RX is ready, no need to c/b once sent to pharmacy  ****   Brenna Gordillo - 10 May 2017 1:41 PM     TASK REPLIED TO: Previously Assigned To Brenna Gordillo  e-rx sent for Kandice Dawson - 10 May 2017 1:56 PM     TASK EDITED  Mother Linden Brizuela aware  Active Problems    1  Back pain, chronic (724 5,338 29) (M54 9,G89 29)   2  Eczema (692 9) (L30 9)   3  Encounter for vision screening (V72 0) (Z01 00)   4  Flu vaccine need (V04 81) (Z23)   5  History of allergy (V15 09) (Z88 9)   6  HLA B27 positive (V84 89) (Z15 89)   7  Low back pain (724 2) (M54 5)   8  Muscle pain, myofacial (729 1) (M79 1)   9  Neck pain (723 1) (M54 2)   10  Need for Menactra vaccination (V03 89) (Z23)   11  Need for prophylactic vaccination and inoculation against influenza (V04 81) (Z23)   12  Oral contraceptive prescribed (V25 01) (Z30 011)   13  Right leg pain (729 5) (M79 604)   14  Sacroiliitis (720 2) (M46 1)   15  Shortness of breath (786 05) (R06 02)   16  Vitamin D deficiency (268 9) (E55 9)    Current Meds   1   Desonide 0 05 % External Ointment; APPLY SPARINGLY TO AFFECTED AREA(S)   TWICE DAILY; Therapy: 06Ora4329 to (Last Rx:19Mpy9172)  Requested for: 24Kfw4670 Ordered   2  Lidocaine 5 % External Ointment; Apply to affected areas 2 to 3 times a day as needed; Therapy: 15Iqr3942 to (Evaluate:61Vkg6381)  Requested for: 57Otg9479; Last   Rx:25Bre9108 Ordered   3  Lidocaine 5 % External Patch; APPLY 1 - 2 PATCHES AT PAINFUL AREA 12 HOURS ON   AND 12 HOURS OFF; Therapy: 41Xha6445 to (Evaluate:23Mar2017)  Requested for: 88Gya2368; Last   Rx:18Ktk1990 Ordered   4  Lo Loestrin Fe 1 MG-10 MCG / 10 MCG Oral Tablet; TAKE 1 TABLET BY MOUTH EVERY   DAY AS DIRECTED; Therapy: 70Riu5059 to (Evaluate:93Dqj9060)  Requested for: 56LHI9298; Last   OE:24IYB5232 Ordered   5  Lorzone 750 MG Oral Tablet; TAKE 1 TABLET Twice daily PRN muscle spasm; Therapy: 73Mcr9821 to (Evaluate:28Qgd3457)  Requested for: 67OCB8812; Last   Rx:81Eqd8428 Ordered   6  Meloxicam 15 MG Oral Tablet; TAKE 1 TABLET DAILY WITH FOOD; Therapy: 59Khu4945 to (Evaluate:49Ojg0256)  Requested for: 92Bxq9617; Last   Rx:24Ikp7571 Ordered   7  Multi-Vitamin Oral Tablet; TAKE 1 TABLET DAILY; Therapy: 49NFG1031 to Recorded   8  Ondansetron 4 MG Oral Tablet Dispersible; TAKE 1 TABLET Every 8 hours PRN nausea; Therapy: 46RVI4805 to (Evaluate:13Jun2016)  Requested for: 24OEF9603; Last   Rx:08Jun2016 Ordered   9  Pamprin All Day Relief Max St 220 MG Oral Tablet; TAKE 1 TABLET TWICE DAILY AS   NEEDED; Therapy: 59Bhr5009 to (Evaluate:13Apr2013) Recorded   10  Sulfamethoxazole-Trimethoprim 800-160 MG Oral Tablet; TAKE 1 TABLET TWICE DAILY    UNTIL FINISHED; Therapy: 01IUZ4649 to (Evaluate:17Ixr9933)  Requested for: 80MMF0957; Last    Rx:53Bzd1239 Ordered   11  TiZANidine HCl - 4 MG Oral Tablet; TAKE 1 TABLET AT BEDTIME; Therapy: 80Hrw8167 to (Evaluate:21Jun2017)  Requested for: 96GPJ7038; Last    Rx:23Mar2017 Ordered   12   Transderm-Scop (1 5 MG) 1 MG/3DAYS Transdermal Patch 72 Hour; APPLY 1 PATCH    EVERY 3 DAYS #2 APPLY MORE THEN 4 HOURS BEFORE EVENT; Therapy: 76CYS2057 to (Last Rx:90Mqr5453)  Requested for: 30UTL4896 Ordered   13  Vitamin D 2000 UNIT Oral Capsule; TAKE 2 CAPSULE Daily; Therapy: (Recorded:16Qfy2677) to Recorded    Allergies    1  No Known Drug Allergies    2  No Known Environmental Allergies   3   No Known Food Allergies    Signatures   Electronically signed by : Sudeep Mendoza RN; May 10 2017  1:57PM EST                       (Author)

## 2018-01-19 ENCOUNTER — GENERIC CONVERSION - ENCOUNTER (OUTPATIENT)
Dept: OTHER | Facility: OTHER | Age: 18
End: 2018-01-19

## 2018-01-22 VITALS
DIASTOLIC BLOOD PRESSURE: 68 MMHG | BODY MASS INDEX: 24.07 KG/M2 | HEIGHT: 64 IN | WEIGHT: 141 LBS | SYSTOLIC BLOOD PRESSURE: 110 MMHG | HEART RATE: 80 BPM | RESPIRATION RATE: 14 BRPM

## 2018-01-23 NOTE — MISCELLANEOUS
Message   Recorded as Task   Date: 01/18/2018 09:34 AM, Created By: Nelson Schmidt   Task Name: Miscellaneous   Assigned To: Norman Sandhoff clinical,Team   Regarding Patient: Milvia Abbasi, Status: Active   Comment:    Nelson Schmidt - 18 Jan 2018 9:34 AM     TASK CREATED  Pt's mother Edmond Watt) called stating that the Tizanidine is not really helping with the pain and it is making her very groggy  Asking if there is something else that can be prescribed for her muscle spasms  She is also taking Meloxicam 15mg daily, which was prescribed by her PCP  Amadeo Stanley would like a call back to discuss at [phone]  Esmer Joseph - 18 Jan 2018 10:00 AM     TASK EDITED  S/W Amadeo Lizeth (mother) who states same  Amadeo Stanley states that when pt gets up to go to the bathroom at night that she is staggering and feels like she is going to fall  Asking if there is something else that can be prescribed for the muscle spasms  Advised will make FQ aware and call back with recommendations  Jewel Crump - 18 Jan 2018 10:01 AM     TASK EDITED  Pt's mother called to add one thing  She needed to address another issue with you  Yanni Motta - 18 Jan 2018 4:30 PM     TASK REPLIED TO: Previously Assigned To Yanni Motta  will e-rx methocarbamol 500mg QHS to take instead   Lili Werner - 19 Jan 2018 9:15 AM     TASK EDITED  Informed pt's mother that methocarbamol 500mh QHS [facility]  Advised to call the office with any adverse reactions  Mother said her daughter doesn't want formal PT but was wondering if we knew of a  that works specifically with pt with chronic pain  I told her that most gyms offer sessions with a  who could set up a specific program for her  I said I was not aware of any but would double check with FQ if he knew of anyone that he would rec     Yanni Motta - 19 Jan 2018 9:35 AM     TASK REPLIED TO: Previously Assigned To Jero Vincent  no i don't know any personal trainers that specialize in chronic pain, but agree with recs   [address] - 19 Jan 2018 11:36 AM     TASK EDITED  S/W pt's mother and advised of the same  She was appreciative of the c/b  Active Problems    1  Back pain, chronic (724 5,338 29) (M54 9,G89 29)   2  Eczema (692 9) (L30 9)   3  Encounter for health maintenance examination (V70 0) (Z00 00)   4  Flu vaccine need (V04 81) (Z23)   5  History of allergy (V15 09) (Z88 9)   6  HLA B27 positive (V84 89) (Z15 89)   7  Low back pain (724 2) (M54 5)   8  Muscle pain, myofacial (729 1) (M79 1)   9  Neck pain (723 1) (M54 2)   10  Oral contraceptive prescribed (V25 01) (Z30 011)   11  Vitamin D deficiency (268 9) (E55 9)    Current Meds   1  Desonide 0 05 % External Ointment; APPLY SPARINGLY TO AFFECTED AREA(S)   TWICE DAILY; Therapy: 81Vda7825 to (Last Jeremie Posadas)  Requested for: 73Weq6191 Ordered   2  Lo Loestrin Fe 1 MG-10 MCG / 10 MCG Oral Tablet; TAKE 1 TABLET BY MOUTH EVERY   DAY AS DIRECTED; Therapy: 83Igu6611 to (Evaluate:69Wqq1159)  Requested for: 92Mbg6139; Last   Rx:19Lrz7774 Ordered   3  Meloxicam 15 MG Oral Tablet; TAKE 1 TABLET DAILY WITH FOOD; Therapy: 10Elz8619 to (Evaluate:33Bxq8113)  Requested for: 38VRK0453; Last   Rx:45Nii0775 Ordered   4  Methocarbamol 500 MG Oral Tablet; TAKE 1 TABLET Bedtime for muscle spasm; Therapy: 55NJC3880 to (Meghan Yun)  Requested for: 54ZHO7151; Last   Rx:34Vcj9013 Ordered   5  Multi-Vitamin Oral Tablet; TAKE 1 TABLET DAILY; Therapy: 56CAI4161 to Recorded   6  Ondansetron 4 MG Oral Tablet Disintegrating; TAKE 1 TABLET Every 8 hours PRN   nausea; Therapy: 89SWN9050 to (Evaluate:13Jun2016)  Requested for: 88SDY4590; Last   Rx:42Iwa4293 Ordered   7  Pamprin All Day Relief Max St 220 MG Oral Tablet; TAKE 1 TABLET TWICE DAILY AS   NEEDED; Therapy: 22Feb2013 to (Evaluate:13Apr2013) Recorded   8   Transderm-Scop (1 5 MG) 1 MG/3DAYS Transdermal Patch 72 Hour (Scopolamine);   APPLY 1 PATCH EVERY 3 DAYS #2 APPLY MORE THEN 4 HOURS   BEFORE EVENT; Therapy: 45GOZ7552 to (Last Rx:37Fwa4913)  Requested for: 08Aly1922 Ordered   9  Vitamin D 2000 UNIT Oral Capsule; TAKE 2 CAPSULE Daily; Therapy: (Recorded:86Tyx3779) to Recorded    Allergies    1  No Known Drug Allergies    2  No Known Environmental Allergies   3   No Known Food Allergies    Signatures   Electronically signed by : Larue Kawasaki, ; Jan 19 2018 11:36AM EST                       (Author)

## 2018-02-06 ENCOUNTER — TELEPHONE (OUTPATIENT)
Dept: PAIN MEDICINE | Facility: MEDICAL CENTER | Age: 18
End: 2018-02-06

## 2018-02-06 NOTE — TELEPHONE ENCOUNTER
S/W pt's mother Jessica Monge who said she is noticing that Krystyna Ramirez is having more spasms but Krystyna Ramirez didn't want her mom to call about the medication b/c Krystyna Ramirez doesn't like taking medication  I confirmed she is taking methocarbamol 500 mg QHS  Jessica Monge reports she is no longer having dizziness from the methocarbamol  [facility]  Jessica Monge said something needs to be done to hlpe with Sammie's spasms and pain  **Per Jessica Monge ok to leave  w/ Dr Ashley Gonzalez recommendation  **

## 2018-02-06 NOTE — TELEPHONE ENCOUNTER
S/W Mina Kelly and again confirmed Quinton Meena was no longer having dizziness from the medication so she was advised per FQ to have Quinton Robledo inc the methocarbamol to 1 5 tabs at   Mina Kelly verbalized understanding

## 2018-02-06 NOTE — TELEPHONE ENCOUNTER
If she's not having dizziness from this mediation, have her try taking methocarbamol 500mg 1 5 tabs QHS

## 2018-02-06 NOTE — TELEPHONE ENCOUNTER
Pt's mother, Mina Kelly called stating pt's medication was changed from [address] [place] to a new medication (she did not know the name of it)  She said the side effects are gone (dizziness), but she is having more spasms and more pain  Mina Kelly said she does not know what to do  Pls call Mina Kelly at [phone]

## 2018-02-07 DIAGNOSIS — G89.29 CHRONIC LOW BACK PAIN, UNSPECIFIED BACK PAIN LATERALITY, WITH SCIATICA PRESENCE UNSPECIFIED: Primary | ICD-10-CM

## 2018-02-07 DIAGNOSIS — M54.5 CHRONIC LOW BACK PAIN, UNSPECIFIED BACK PAIN LATERALITY, WITH SCIATICA PRESENCE UNSPECIFIED: Primary | ICD-10-CM

## 2018-02-07 RX ORDER — MELOXICAM 15 MG/1
TABLET ORAL
Qty: 30 TABLET | Refills: 2 | Status: SHIPPED | OUTPATIENT
Start: 2018-02-07 | End: 2018-04-28 | Stop reason: SDUPTHER

## 2018-02-14 ENCOUNTER — TELEPHONE (OUTPATIENT)
Dept: RADIOLOGY | Facility: CLINIC | Age: 18
End: 2018-02-14

## 2018-02-14 NOTE — TELEPHONE ENCOUNTER
S/W Pedro Beard, pt's mother, and advised of Dr Shah  rec to stop the methocarbamol for now and I scheduled ov for 2/19 at 3:15 w/ Monica Sahni for re-eval per FQ

## 2018-02-14 NOTE — TELEPHONE ENCOUNTER
Received call from pt's mother Sloan Anne that Josie Walters doesn't feel any different with the increased methocarbamol 500 mg to 1 5 tabs at [facility] that was made last week  She is not having any s/e with the inc dosing  Sloan Anne wants Dr Sarah Schaefer to re-eval what they should do  Sloan Anne said Josie Walters took the last dose last night and if he is going to continue her on it she will need [facility]  Pls advise  Mitali's cell # [phone]

## 2018-02-20 DIAGNOSIS — M54.5 CHRONIC LOW BACK PAIN, UNSPECIFIED BACK PAIN LATERALITY, WITH SCIATICA PRESENCE UNSPECIFIED: ICD-10-CM

## 2018-02-20 DIAGNOSIS — G89.29 CHRONIC LOW BACK PAIN, UNSPECIFIED BACK PAIN LATERALITY, WITH SCIATICA PRESENCE UNSPECIFIED: ICD-10-CM

## 2018-04-28 DIAGNOSIS — M54.5 CHRONIC LOW BACK PAIN, UNSPECIFIED BACK PAIN LATERALITY, WITH SCIATICA PRESENCE UNSPECIFIED: ICD-10-CM

## 2018-04-28 DIAGNOSIS — G89.29 CHRONIC LOW BACK PAIN, UNSPECIFIED BACK PAIN LATERALITY, WITH SCIATICA PRESENCE UNSPECIFIED: ICD-10-CM

## 2018-04-29 RX ORDER — MELOXICAM 15 MG/1
TABLET ORAL
Qty: 30 TABLET | Refills: 0 | Status: SHIPPED | OUTPATIENT
Start: 2018-04-29 | End: 2018-05-26 | Stop reason: SDUPTHER

## 2018-05-07 ENCOUNTER — TELEPHONE (OUTPATIENT)
Dept: FAMILY MEDICINE CLINIC | Facility: CLINIC | Age: 18
End: 2018-05-07

## 2018-05-07 DIAGNOSIS — T75.3XXA MOTION SICKNESS, INITIAL ENCOUNTER: Primary | ICD-10-CM

## 2018-05-07 RX ORDER — SCOLOPAMINE TRANSDERMAL SYSTEM 1 MG/1
1 PATCH, EXTENDED RELEASE TRANSDERMAL
Qty: 2 PATCH | Refills: 0 | Status: SHIPPED | OUTPATIENT
Start: 2018-05-07 | End: 2018-07-10

## 2018-05-26 DIAGNOSIS — M54.5 CHRONIC LOW BACK PAIN, UNSPECIFIED BACK PAIN LATERALITY, WITH SCIATICA PRESENCE UNSPECIFIED: ICD-10-CM

## 2018-05-26 DIAGNOSIS — G89.29 CHRONIC LOW BACK PAIN, UNSPECIFIED BACK PAIN LATERALITY, WITH SCIATICA PRESENCE UNSPECIFIED: ICD-10-CM

## 2018-05-27 RX ORDER — MELOXICAM 15 MG/1
TABLET ORAL
Qty: 30 TABLET | Refills: 0 | Status: SHIPPED | OUTPATIENT
Start: 2018-05-27 | End: 2018-07-10

## 2018-07-10 ENCOUNTER — OFFICE VISIT (OUTPATIENT)
Dept: URGENT CARE | Age: 18
End: 2018-07-10
Payer: COMMERCIAL

## 2018-07-10 VITALS
BODY MASS INDEX: 22.82 KG/M2 | WEIGHT: 137 LBS | HEART RATE: 114 BPM | SYSTOLIC BLOOD PRESSURE: 124 MMHG | RESPIRATION RATE: 16 BRPM | OXYGEN SATURATION: 97 % | HEIGHT: 65 IN | TEMPERATURE: 99.5 F | DIASTOLIC BLOOD PRESSURE: 71 MMHG

## 2018-07-10 DIAGNOSIS — M76.52 PATELLAR TENDINITIS OF LEFT KNEE: Primary | ICD-10-CM

## 2018-07-10 PROCEDURE — 99213 OFFICE O/P EST LOW 20 MIN: CPT | Performed by: PHYSICIAN ASSISTANT

## 2018-07-10 RX ORDER — IBUPROFEN 600 MG/1
600 TABLET ORAL EVERY 6 HOURS PRN
Qty: 60 TABLET | Refills: 0 | Status: SHIPPED | OUTPATIENT
Start: 2018-07-10 | End: 2018-07-18 | Stop reason: ALTCHOICE

## 2018-07-10 RX ORDER — MULTIVIT-MIN/IRON/FOLIC ACID/K 18-600-40
2 CAPSULE ORAL DAILY
COMMUNITY
End: 2018-07-18 | Stop reason: ALTCHOICE

## 2018-07-10 RX ORDER — MELOXICAM 15 MG/1
1 TABLET ORAL
COMMUNITY
Start: 2017-04-17 | End: 2018-07-18 | Stop reason: ALTCHOICE

## 2018-07-10 RX ORDER — METHOCARBAMOL 500 MG/1
1 TABLET, FILM COATED ORAL
COMMUNITY
Start: 2018-01-18 | End: 2018-07-18 | Stop reason: SDUPTHER

## 2018-07-10 NOTE — PATIENT INSTRUCTIONS
Where patellar strap when you are going to be active  Ice as needed  Use Motrin for the next couple days  Tylenol as needed for additional pain support  Follow up with PCP in 3-5 days  Proceed to  ER if symptoms worsen  Patellar Tendinitis   WHAT YOU NEED TO KNOW:   Patellar tendinitis is inflammation or irritation of the tendon that connects your kneecap to your shinbone  It may be a short-term condition or develop into a long-term weakness in your knee  DISCHARGE INSTRUCTIONS:   Medicines:   · NSAIDs , such as ibuprofen, help decrease swelling, pain, and fever  This medicine is available with or without a doctor's order  NSAIDs can cause stomach bleeding or kidney problems in certain people  If you take blood thinner medicine, always ask if NSAIDs are safe for you  Always read the medicine label and follow directions  Do not give these medicines to children under 10months of age without direction from your child's healthcare provider  · Acetaminophen  helps decrease pain  This medicine is available without a doctor's order  Ask how much to take and how often to take it  Acetaminophen can cause liver damage if not taken correctly  · Take your medicine as directed  Contact your healthcare provider if you think your medicine is not helping or if you have side effects  Tell him or her if you are allergic to any medicine  Keep a list of the medicines, vitamins, and herbs you take  Include the amounts, and when and why you take them  Bring the list or the pill bottles to follow-up visits  Carry your medicine list with you in case of an emergency  Follow up with your healthcare provider within 2 weeks or as directed: You may need to return for more tests  You may also be referred to an orthopedic surgeon  Write down your questions so you remember to ask them during your visits     Manage your patellar tendinitis:   · Rest  your knee so it can heal  Do not begin an activity until directed by your healthcare provider  · Ice  your knee 15 to 20 minutes every hour or as directed  Use and ice pack, or put crushed ice in a bag  Cover it with a towel  Ice prevents tissue damage and decreases swelling and pain  · Support  your knee as directed  Ask for more information on types of braces that support your patellar tendon and allow it to heal      · Go to physical therapy  as directed  A physical therapist can teach you exercises to stretch and strengthen leg muscles that support your tendon  Contact your healthcare provider if:   · You have a fever  · You have sudden swelling in your knee  · Your pain continues or gets worse even after you take pain medicine  · The skin over your knee becomes red and swollen  · You have questions or concerns about your condition or care  Return to the emergency department if:   · You hear a sudden snap or pop or see immediate bruising around your knee  · You cannot bend your knee or bear weight on your leg  © 2017 [address] Information is for End User's use only and may not be sold, redistributed or otherwise used for commercial purposes  All illustrations and images included in CareNotes® are the copyrighted property of [employer]  or Jose Sheffield  The above information is an  only  It is not intended as medical advice for individual conditions or treatments  Talk to your doctor, nurse or pharmacist before following any medical regimen to see if it is safe and effective for you

## 2018-07-10 NOTE — PROGRESS NOTES
[facility]        NAME: Yuan Ryder is a 21 y o  female  : 2000    MRN: [id number]  DATE: July 10, 2018  TIME: 7:42 PM    Assessment and Plan   Patellar tendinitis of left knee [M76 52]  1  Patellar tendinitis of left knee  ibuprofen (MOTRIN) 600 mg tablet         Patient Instructions     Where patellar strap when you are going to be active  Ice as needed  Use Motrin for the next couple days  Tylenol as needed for additional pain support  Follow up with PCP in 3-5 days  Proceed to  ER if symptoms worsen  Chief Complaint     Chief Complaint   Patient presents with    Knee Pain     Pt c/o left knee pain and tightness for the past 3 weeks, especially when walking, bending, going up stairs  No injury  History of Present Illness       23-year-old female reports left knee pain for the past 4 days  No trauma reported  No new activities it exercising running or jumping  Patient reports walking up and down stairs aggravates it and points to the front part of the knee      Knee Pain    The incident occurred 3 to 5 days ago  The incident occurred at home  There was no injury mechanism  The pain is present in the left knee  The quality of the pain is described as aching  The pain is moderate  The pain has been constant since onset  Pertinent negatives include no inability to bear weight, loss of motion, loss of sensation, muscle weakness, numbness or tingling  She reports no foreign bodies present  Nothing aggravates the symptoms  She has tried nothing for the symptoms  The treatment provided mild relief  Review of Systems   Review of Systems   Constitutional: Negative  HENT: Negative  Eyes: Negative  Respiratory: Negative  Cardiovascular: Negative  Gastrointestinal: Negative  Musculoskeletal: Positive for arthralgias  Skin: Negative  Neurological: Negative for tingling and numbness           Current Medications       Current Outpatient Prescriptions:    meloxicam (MOBIC) 15 mg tablet, Take 1 tablet by mouth, Disp: , Rfl:     methocarbamol (ROBAXIN) 500 mg tablet, Take 1 tablet by mouth, Disp: , Rfl:     Norethin-Eth Estrad-Fe Biphas (LO LOESTRIN FE) 1 MG-10 MCG / 10 MCG TABS, Take 1 tablet by mouth daily, Disp: , Rfl:     Cholecalciferol (VITAMIN D) 2000 units CAPS, Take 2 capsules by mouth daily, Disp: , Rfl:     ibuprofen (MOTRIN) 600 mg tablet, Take 1 tablet (600 mg total) by mouth every 6 (six) hours as needed for mild pain, Disp: 60 tablet, Rfl: 0    Current Allergies     Allergies as of 07/10/2018    (No Known Allergies)            The following portions of the patient's history were reviewed and updated as appropriate: allergies, current medications, past family history, past medical history, past social history, past surgical history and problem list      Past Medical History:   Diagnosis Date    Arthritis     in spine       History reviewed  No pertinent surgical history  History reviewed  No pertinent family history  Medications have been verified  Objective   /71   Pulse (!) 114   Temp 99 5 °F (37 5 °C)   Resp 16   Ht 5' 5" (1 651 m)   Wt 62 1 kg (137 lb)   LMP 06/30/2018 (Approximate)   SpO2 97%   BMI 22 80 kg/m²        Physical Exam     Physical Exam   Constitutional: She is oriented to person, place, and time  She appears well-developed and well-nourished  No distress  HENT:   Head: Normocephalic and atraumatic  Right Ear: External ear normal    Left Ear: External ear normal    Nose: Nose normal    Mouth/Throat: Oropharynx is clear and moist  No oropharyngeal exudate  Eyes: Conjunctivae are normal  Right eye exhibits no discharge  Left eye exhibits no discharge  Neck: Normal range of motion  Neck supple  Cardiovascular: Normal rate, regular rhythm, normal heart sounds and intact distal pulses  No murmur heard  Pulmonary/Chest: Effort normal and breath sounds normal  No respiratory distress   She has no wheezes  She has no rales  Abdominal: Soft  Bowel sounds are normal  There is no tenderness  Musculoskeletal: Normal range of motion  Left knee: She exhibits normal range of motion, no swelling, no effusion, no ecchymosis, no deformity, no laceration, no erythema, normal alignment, no LCL laxity, no bony tenderness, normal meniscus and no MCL laxity  Tenderness found  Patellar tendon tenderness noted  Legs:  Lymphadenopathy:     She has no cervical adenopathy  Neurological: She is alert and oriented to person, place, and time  Skin: Skin is warm and dry  Psychiatric: She has a normal mood and affect  Nursing note and vitals reviewed

## 2018-07-18 ENCOUNTER — OFFICE VISIT (OUTPATIENT)
Dept: FAMILY MEDICINE CLINIC | Facility: CLINIC | Age: 18
End: 2018-07-18
Payer: COMMERCIAL

## 2018-07-18 VITALS
DIASTOLIC BLOOD PRESSURE: 80 MMHG | RESPIRATION RATE: 16 BRPM | HEIGHT: 64 IN | BODY MASS INDEX: 22.94 KG/M2 | HEART RATE: 80 BPM | SYSTOLIC BLOOD PRESSURE: 120 MMHG | WEIGHT: 134.4 LBS | TEMPERATURE: 97.9 F

## 2018-07-18 DIAGNOSIS — M54.50 CHRONIC BILATERAL LOW BACK PAIN WITHOUT SCIATICA: ICD-10-CM

## 2018-07-18 DIAGNOSIS — G89.29 CHRONIC BILATERAL LOW BACK PAIN WITHOUT SCIATICA: ICD-10-CM

## 2018-07-18 DIAGNOSIS — Z00.129 ENCOUNTER FOR WELL ADOLESCENT VISIT WITHOUT ABNORMAL FINDINGS: Primary | ICD-10-CM

## 2018-07-18 PROCEDURE — 99395 PREV VISIT EST AGE 18-39: CPT | Performed by: NURSE PRACTITIONER

## 2018-07-18 RX ORDER — MELOXICAM 15 MG/1
15 TABLET ORAL DAILY
Qty: 30 TABLET | Refills: 1 | Status: SHIPPED | OUTPATIENT
Start: 2018-07-18 | End: 2020-01-31

## 2018-07-18 RX ORDER — METHOCARBAMOL 500 MG/1
500 TABLET, FILM COATED ORAL
Qty: 30 TABLET | Refills: 5 | Status: SHIPPED | OUTPATIENT
Start: 2018-07-18 | End: 2020-01-31

## 2018-07-18 NOTE — PROGRESS NOTES
Assessment/Plan     Healthy female exam     Well adolescent without abnormal findings    2  Patient Counseling:  --Nutrition: Stressed importance of moderation in sodium/caffeine intake, saturated fat and cholesterol, caloric balance, sufficient intake of fresh fruits, vegetables, fiber, calcium, iron, --Exercise: Stressed the importance of regular exercise  --Sexuality: Discussed sexually transmitted diseases, partner selection, use of condoms, avoidance of unintended pregnancy  and contraceptive alternatives  --Injury prevention: Discussed safety belts, safety helmets, smoke detector, smoking near bedding or upholstery  --Dental health: Discussed importance of regular tooth brushing, flossing, and dental visits  --Immunizations reviewed  --Discussed benefits of screening colonoscopy  --After hours service discussed with patient    3  Discussed the patient's BMI with her  The BMI is in the acceptable range  4  Follow up in one year  Subjective     Genny Ho is a 21 y o  female and is here for a comprehensive physical exam  The patient reports no problems  Do you take any herbs or supplements that were not prescribed by a doctor? no  Are you taking calcium supplements? no  Are you taking aspirin daily? no     History:  LMP: Patient's last menstrual period was 06/30/2018 (approximate)      The following portions of the patient's history were reviewed and updated as appropriate: allergies, current medications, past family history, past medical history, past social history, past surgical history and problem list     Review of Systems  Do you have pain that bothers you in your daily life? no  Constitutional: negative  Eyes: negative  Ears, nose, mouth, throat, and face: negative  Respiratory: negative  Cardiovascular: negative  Gastrointestinal: negative  Genitourinary:negative  Integument/breast: negative  Hematologic/lymphatic: negative  Musculoskeletal:negative  Neurological: negative  Behavioral/Psych: negative  Endocrine: negative  Allergic/Immunologic: negative  Objective     /80 (BP Location: Right arm, Patient Position: Sitting, Cuff Size: Standard)   Pulse 80   Temp 97 9 °F (36 6 °C)   Resp 16   Ht 5' 4 25" (1 632 m)   Wt 61 kg (134 lb 6 4 oz)   LMP 06/30/2018 (Approximate)   BMI 22 89 kg/m²   Family History   Problem Relation Age of Onset    Arthritis Mother     Hyperlipidemia Father     Hypertension Father     Asthma Paternal Grandmother      Past Medical History:   Diagnosis Date    Arthritis     in spine     Social History     Social History    Marital status: Single     Spouse name: N/A    Number of children: N/A    Years of education: N/A     Occupational History    Not on file       Social History Main Topics    Smoking status: Never Smoker    Smokeless tobacco: Not on file    Alcohol use No    Drug use: No    Sexual activity: Not on file     Other Topics Concern    Not on file     Social History Narrative    Cola Consumption       Current Outpatient Prescriptions:     meloxicam (MOBIC) 15 mg tablet, Take 1 tablet (15 mg total) by mouth daily, Disp: 30 tablet, Rfl: 1    methocarbamol (ROBAXIN) 500 mg tablet, Take 1 tablet (500 mg total) by mouth daily at bedtime, Disp: 30 tablet, Rfl: 5    Norethin-Eth Estrad-Fe Biphas (LO LOESTRIN FE) 1 MG-10 MCG / 10 MCG TABS, Take 1 tablet by mouth daily, Disp: , Rfl:   No Known Allergies  General Appearance:    Alert, cooperative, no distress, appears stated age   Head:    Normocephalic, without obvious abnormality, atraumatic   Eyes:    PERRL, conjunctiva/corneas clear, EOM's intact, fundi     benign, both eyes   Ears:    Normal TM's and external ear canals, both ears   Nose:   Nares normal, septum midline, mucosa normal, no drainage    or sinus tenderness   Throat:   Lips, mucosa, and tongue normal; teeth and gums normal   Neck:   Supple, symmetrical, trachea midline, no adenopathy;     thyroid:  no enlargement/tenderness/nodules; no carotid    bruit or JVD   Back:     Symmetric, no curvature, ROM normal, no CVA tenderness   Lungs:     Clear to auscultation bilaterally, respirations unlabored   Chest Wall:    No tenderness or deformity    Heart:    Regular rate and rhythm, S1 and S2 normal, no murmur, rub   or gallop   Breast Exam:    No tenderness, masses, or nipple abnormality   Abdomen:     Soft, non-tender, bowel sounds active all four quadrants,     no masses, no organomegaly   Genitalia:    Normal female without lesion, discharge or tenderness   Rectal:    Normal tone, no masses or tenderness; guaiac negative stool   Extremities:   Extremities normal, atraumatic, no cyanosis or edema   Pulses:   2+ and symmetric all extremities   Skin:   Skin color, texture, turgor normal, no rashes or lesions   Lymph nodes:   Cervical, supraclavicular, and axillary nodes normal   Neurologic:   CNII-XII intact, normal strength, sensation and reflexes     throughout

## 2018-07-29 RX ORDER — NORETHINDRONE ACETATE AND ETHINYL ESTRADIOL, ETHINYL ESTRADIOL AND FERROUS FUMARATE 1MG-10(24)
KIT ORAL
Qty: 28 TABLET | Refills: 0 | Status: CANCELLED | OUTPATIENT
Start: 2018-07-29

## 2018-07-30 ENCOUNTER — TELEPHONE (OUTPATIENT)
Dept: FAMILY MEDICINE CLINIC | Facility: CLINIC | Age: 18
End: 2018-07-30

## 2018-07-31 DIAGNOSIS — T38.4X5D ORAL CONTRACEPTIVE CAUSING ADVERSE EFFECT IN THERAPEUTIC USE, SUBSEQUENT ENCOUNTER: Primary | ICD-10-CM

## 2018-10-10 DIAGNOSIS — T75.3XXA MOTION SICKNESS, INITIAL ENCOUNTER: ICD-10-CM

## 2018-10-11 RX ORDER — SCOPOLAMINE 1 MG/3 DAY
1 PATCH,TRANSDERMAL 3 DAY TRANSDERMAL
Qty: 4 PATCH | Refills: 0 | Status: SHIPPED | OUTPATIENT
Start: 2018-10-11 | End: 2019-02-04 | Stop reason: SDUPTHER

## 2019-01-17 ENCOUNTER — TELEPHONE (OUTPATIENT)
Dept: PAIN MEDICINE | Facility: CLINIC | Age: 19
End: 2019-01-17

## 2019-02-04 DIAGNOSIS — T75.3XXA MOTION SICKNESS, INITIAL ENCOUNTER: ICD-10-CM

## 2019-02-04 RX ORDER — SCOLOPAMINE TRANSDERMAL SYSTEM 1 MG/1
1 PATCH, EXTENDED RELEASE TRANSDERMAL
Qty: 4 PATCH | Refills: 0 | Status: SHIPPED | OUTPATIENT
Start: 2019-02-04 | End: 2019-07-09 | Stop reason: SDUPTHER

## 2019-02-04 NOTE — TELEPHONE ENCOUNTER
Please refill the:    TRANSDERM-SCOP 1 5MG/1MG/3 DAYS TD 72HR PATCH, 4 PA    She is going on a bus trip  Please send to [facility] in chart  Thank you!

## 2019-06-21 DIAGNOSIS — T38.4X5D ORAL CONTRACEPTIVE CAUSING ADVERSE EFFECT IN THERAPEUTIC USE, SUBSEQUENT ENCOUNTER: ICD-10-CM

## 2019-06-23 RX ORDER — NORETHINDRONE ACETATE AND ETHINYL ESTRADIOL, ETHINYL ESTRADIOL AND FERROUS FUMARATE 1MG-10(24)
KIT ORAL
Qty: 28 TABLET | Refills: 11 | Status: SHIPPED | OUTPATIENT
Start: 2019-06-23 | End: 2020-02-05 | Stop reason: SDUPTHER

## 2019-07-09 DIAGNOSIS — T75.3XXA MOTION SICKNESS, INITIAL ENCOUNTER: ICD-10-CM

## 2019-07-09 RX ORDER — SCOLOPAMINE TRANSDERMAL SYSTEM 1 MG/1
1 PATCH, EXTENDED RELEASE TRANSDERMAL
Qty: 4 PATCH | Refills: 0 | Status: SHIPPED | OUTPATIENT
Start: 2019-07-09 | End: 2020-01-31

## 2019-07-09 NOTE — TELEPHONE ENCOUNTER
Patient's mother called, she would like a refill on patches, scopolamine (TRANSDERM-SCOP, 1 5 MG,) 1 5 mg/3 days TD 72 hr patch   Place 1 patch on the skin every third day    She also requested medication for anti-nausea, however, I don't see anything in the chart  [id number] [address] [place] is leaving on Saturday, 7/13 and needs a week's amount of the meds before then

## 2020-01-31 ENCOUNTER — OFFICE VISIT (OUTPATIENT)
Dept: FAMILY MEDICINE CLINIC | Facility: CLINIC | Age: 20
End: 2020-01-31
Payer: COMMERCIAL

## 2020-01-31 VITALS
SYSTOLIC BLOOD PRESSURE: 122 MMHG | WEIGHT: 139 LBS | RESPIRATION RATE: 16 BRPM | OXYGEN SATURATION: 98 % | BODY MASS INDEX: 23.67 KG/M2 | HEART RATE: 87 BPM | DIASTOLIC BLOOD PRESSURE: 60 MMHG

## 2020-01-31 DIAGNOSIS — M25.50 ARTHRALGIA, UNSPECIFIED JOINT: Primary | ICD-10-CM

## 2020-01-31 DIAGNOSIS — R21 RASH: ICD-10-CM

## 2020-01-31 DIAGNOSIS — R53.83 OTHER FATIGUE: ICD-10-CM

## 2020-01-31 PROCEDURE — 99214 OFFICE O/P EST MOD 30 MIN: CPT | Performed by: NURSE PRACTITIONER

## 2020-01-31 PROCEDURE — 1036F TOBACCO NON-USER: CPT | Performed by: NURSE PRACTITIONER

## 2020-01-31 RX ORDER — CLOBETASOL PROPIONATE 0.46 MG/ML
SOLUTION TOPICAL 2 TIMES DAILY
Qty: 50 ML | Refills: 1 | Status: SHIPPED | OUTPATIENT
Start: 2020-01-31 | End: 2020-01-31 | Stop reason: SDUPTHER

## 2020-01-31 RX ORDER — CLOBETASOL PROPIONATE 0.46 MG/ML
SOLUTION TOPICAL 2 TIMES DAILY
Qty: 50 ML | Refills: 1 | Status: SHIPPED | OUTPATIENT
Start: 2020-01-31 | End: 2020-12-11

## 2020-01-31 NOTE — PROGRESS NOTES
Assessment/Plan:           Problem List Items Addressed This Visit        Musculoskeletal and Integument    Rash    Relevant Medications    clobetasol (TEMOVATE) 0 05 % external solution    Other Relevant Orders    Comprehensive metabolic panel    CBC and differential    TSH, 3rd generation with Free T4 reflex    C-reactive protein    RF Screen w/ Reflex to Titer    Cyclic citrul peptide antibody, IgG    GRACIA Screen w/ Reflex to Titer/Pattern    Lyme Antibody Profile with reflex to WB       Other    Arthralgia - Primary    Relevant Orders    Comprehensive metabolic panel    CBC and differential    TSH, 3rd generation with Free T4 reflex    C-reactive protein    RF Screen w/ Reflex to Titer    Cyclic citrul peptide antibody, IgG    GRACIA Screen w/ Reflex to Titer/Pattern    Lyme Antibody Profile with reflex to WB    Other fatigue    Relevant Orders    Comprehensive metabolic panel    CBC and differential    TSH, 3rd generation with Free T4 reflex    C-reactive protein    RF Screen w/ Reflex to Titer    Cyclic citrul peptide antibody, IgG    GRACIA Screen w/ Reflex to Titer/Pattern    Lyme Antibody Profile with reflex to WB            Subjective:      Patient ID: Naeem Spann is a 22 y o  female  Here for c/o rash on scalp  Has had for over one year  Has tried eucrissa and caused pain  To see rheumatology   Hx of workup for rheumatological issue but never finished the process        The following portions of the patient's history were reviewed and updated as appropriate: allergies, current medications, past family history, past medical history, past social history, past surgical history and problem list     Review of Systems   Constitutional: Positive for fatigue  Negative for fever  Respiratory: Negative for cough and shortness of breath  Cardiovascular: Negative for chest pain and palpitations  Gastrointestinal: Negative for constipation and diarrhea  Musculoskeletal: Positive for arthralgias   Negative for myalgias  Skin: Positive for rash  Neurological: Negative for dizziness and headaches  Hematological: Negative for adenopathy  Psychiatric/Behavioral: Negative for dysphoric mood  The patient is not nervous/anxious            Objective:      /60 (BP Location: Right arm, Patient Position: Sitting, Cuff Size: Standard)   Pulse 87   Resp 16   Wt 63 kg (139 lb)   SpO2 98%   BMI 23 67 kg/m²     Family History   Problem Relation Age of Onset    Arthritis Mother     Hyperlipidemia Father     Hypertension Father     Asthma Paternal Grandmother      Past Medical History:   Diagnosis Date    Arthritis     in spine     Social History     Socioeconomic History    Marital status: Single     Spouse name: Not on file    Number of children: Not on file    Years of education: Not on file    Highest education level: Not on file   Occupational History    Not on file   Social Needs    Financial resource strain: Not on file    Food insecurity:     Worry: Not on file     Inability: Not on file    Transportation needs:     Medical: Not on file     Non-medical: Not on file   Tobacco Use    Smoking status: Never Smoker   Substance and Sexual Activity    Alcohol use: No    Drug use: No    Sexual activity: Not on file   Lifestyle    Physical activity:     Days per week: Not on file     Minutes per session: Not on file    Stress: Not on file   Relationships    Social connections:     Talks on phone: Not on file     Gets together: Not on file     Attends Alevism service: Not on file     Active member of club or organization: Not on file     Attends meetings of clubs or organizations: Not on file     Relationship status: Not on file    Intimate partner violence:     Fear of current or ex partner: Not on file     Emotionally abused: Not on file     Physically abused: Not on file     Forced sexual activity: Not on file   Other Topics Concern    Not on file   Social History Narrative    Cola Consumption Current Outpatient Medications:     LO LOESTRIN FE 1 MG-10 MCG / 10 MCG TABS, TAKE 1 TABLET BY MOUTH DAILY, Disp: 28 tablet, Rfl: 11  No Known Allergies     Physical Exam   Constitutional: She is oriented to person, place, and time  She appears well-developed and well-nourished  Eyes: Conjunctivae are normal    Cardiovascular: Normal rate, regular rhythm and normal heart sounds  Pulmonary/Chest: Effort normal and breath sounds normal    Musculoskeletal: Normal range of motion  Neurological: She is alert and oriented to person, place, and time  Skin: Skin is warm and dry  Capillary refill takes less than 2 seconds  White plaque (psoriasis)   Psychiatric: She has a normal mood and affect  Her behavior is normal    Nursing note and vitals reviewed

## 2020-02-05 ENCOUNTER — TELEPHONE (OUTPATIENT)
Dept: FAMILY MEDICINE CLINIC | Facility: CLINIC | Age: 20
End: 2020-02-05

## 2020-02-05 DIAGNOSIS — T38.4X5D ORAL CONTRACEPTIVE CAUSING ADVERSE EFFECT IN THERAPEUTIC USE, SUBSEQUENT ENCOUNTER: ICD-10-CM

## 2020-02-05 NOTE — TELEPHONE ENCOUNTER
Patients mom called stating that patient was in on 01/31/2020 to see Alexa Cuevas prescribed clobetasol (Sandra Blackbird)  Cream did respond  The redness and flakiness improved  The redness is not intense anymore  Patients mom wants to know how long she should use the cream for yet since the instructions say to not use any longer then two weeks  Should she only use if it flares up again? Patient also needs the LO LOESTRIN FE 1 MG-10 MCG to a different pharmacy  It will not be [facility] in [place]  Pharmacy is in chart

## 2020-02-05 NOTE — TELEPHONE ENCOUNTER
Discussed with [facility] pt can use the colbetasol as needed for flare ups and refilll for lo loestrin was sent to [facility] for approval

## 2020-02-11 ENCOUNTER — TELEPHONE (OUTPATIENT)
Dept: FAMILY MEDICINE CLINIC | Facility: CLINIC | Age: 20
End: 2020-02-11

## 2020-02-11 NOTE — TELEPHONE ENCOUNTER
Mom called stating for the past 5 days Arash Francisco has had heart palpitations off and on through the day  She was told this could be a side effect of the clobetasol and wanted to know Dalila's opinion  This is even without caffeine intake  Please advise and call mom Dl Farrar) back at number listed in chart

## 2020-02-11 NOTE — TELEPHONE ENCOUNTER
Any steroids can cause palpittations but less likely from topical  She can stop the topical and see if they go away after a few days  jagdish

## 2020-02-13 PROBLEM — R21 RASH: Status: ACTIVE | Noted: 2020-02-13

## 2020-02-13 PROBLEM — R53.83 OTHER FATIGUE: Status: ACTIVE | Noted: 2020-02-13

## 2020-02-13 PROBLEM — M25.50 ARTHRALGIA: Status: ACTIVE | Noted: 2020-02-13

## 2020-02-29 ENCOUNTER — APPOINTMENT (OUTPATIENT)
Dept: LAB | Age: 20
End: 2020-02-29
Payer: COMMERCIAL

## 2020-02-29 DIAGNOSIS — R21 RASH: ICD-10-CM

## 2020-02-29 DIAGNOSIS — R53.83 OTHER FATIGUE: ICD-10-CM

## 2020-02-29 DIAGNOSIS — M25.50 ARTHRALGIA, UNSPECIFIED JOINT: ICD-10-CM

## 2020-02-29 LAB
ALBUMIN SERPL BCP-MCNC: 4 G/DL (ref 3.5–5)
ALP SERPL-CCNC: 48 U/L (ref 46–116)
ALT SERPL W P-5'-P-CCNC: 16 U/L (ref 12–78)
ANION GAP SERPL CALCULATED.3IONS-SCNC: 9 MMOL/L (ref 4–13)
AST SERPL W P-5'-P-CCNC: 18 U/L (ref 5–45)
BASOPHILS # BLD AUTO: 0.06 THOUSANDS/ΜL (ref 0–0.1)
BASOPHILS NFR BLD AUTO: 1 % (ref 0–1)
BILIRUB SERPL-MCNC: 0.55 MG/DL (ref 0.2–1)
BUN SERPL-MCNC: 16 MG/DL (ref 5–25)
CALCIUM SERPL-MCNC: 9.1 MG/DL (ref 8.3–10.1)
CHLORIDE SERPL-SCNC: 106 MMOL/L (ref 100–108)
CO2 SERPL-SCNC: 23 MMOL/L (ref 21–32)
CREAT SERPL-MCNC: 0.87 MG/DL (ref 0.6–1.3)
CRP SERPL QL: 7.2 MG/L
EOSINOPHIL # BLD AUTO: 0.21 THOUSAND/ΜL (ref 0–0.61)
EOSINOPHIL NFR BLD AUTO: 4 % (ref 0–6)
ERYTHROCYTE [DISTWIDTH] IN BLOOD BY AUTOMATED COUNT: 11.9 % (ref 11.6–15.1)
GFR SERPL CREATININE-BSD FRML MDRD: 96 ML/MIN/1.73SQ M
GLUCOSE P FAST SERPL-MCNC: 87 MG/DL (ref 65–99)
HCT VFR BLD AUTO: 40.9 % (ref 34.8–46.1)
HGB BLD-MCNC: 13.6 G/DL (ref 11.5–15.4)
IMM GRANULOCYTES # BLD AUTO: 0.01 THOUSAND/UL (ref 0–0.2)
IMM GRANULOCYTES NFR BLD AUTO: 0 % (ref 0–2)
LYMPHOCYTES # BLD AUTO: 1.98 THOUSANDS/ΜL (ref 0.6–4.47)
LYMPHOCYTES NFR BLD AUTO: 38 % (ref 14–44)
MCH RBC QN AUTO: 30.2 PG (ref 26.8–34.3)
MCHC RBC AUTO-ENTMCNC: 33.3 G/DL (ref 31.4–37.4)
MCV RBC AUTO: 91 FL (ref 82–98)
MONOCYTES # BLD AUTO: 0.43 THOUSAND/ΜL (ref 0.17–1.22)
MONOCYTES NFR BLD AUTO: 8 % (ref 4–12)
NEUTROPHILS # BLD AUTO: 2.49 THOUSANDS/ΜL (ref 1.85–7.62)
NEUTS SEG NFR BLD AUTO: 49 % (ref 43–75)
NRBC BLD AUTO-RTO: 0 /100 WBCS
PLATELET # BLD AUTO: 298 THOUSANDS/UL (ref 149–390)
PMV BLD AUTO: 9.7 FL (ref 8.9–12.7)
POTASSIUM SERPL-SCNC: 3.6 MMOL/L (ref 3.5–5.3)
PROT SERPL-MCNC: 7.8 G/DL (ref 6.4–8.2)
RBC # BLD AUTO: 4.51 MILLION/UL (ref 3.81–5.12)
SODIUM SERPL-SCNC: 138 MMOL/L (ref 136–145)
TSH SERPL DL<=0.05 MIU/L-ACNC: 1.98 UIU/ML (ref 0.36–3.74)
WBC # BLD AUTO: 5.18 THOUSAND/UL (ref 4.31–10.16)

## 2020-02-29 PROCEDURE — 86038 ANTINUCLEAR ANTIBODIES: CPT

## 2020-02-29 PROCEDURE — 85025 COMPLETE CBC W/AUTO DIFF WBC: CPT

## 2020-02-29 PROCEDURE — 86618 LYME DISEASE ANTIBODY: CPT

## 2020-02-29 PROCEDURE — 80053 COMPREHEN METABOLIC PANEL: CPT

## 2020-02-29 PROCEDURE — 36415 COLL VENOUS BLD VENIPUNCTURE: CPT

## 2020-02-29 PROCEDURE — 86430 RHEUMATOID FACTOR TEST QUAL: CPT

## 2020-02-29 PROCEDURE — 84443 ASSAY THYROID STIM HORMONE: CPT

## 2020-02-29 PROCEDURE — 86200 CCP ANTIBODY: CPT

## 2020-02-29 PROCEDURE — 86140 C-REACTIVE PROTEIN: CPT

## 2020-03-02 LAB
B BURGDOR IGG+IGM SER-ACNC: <0.91 ISR (ref 0–0.9)
RHEUMATOID FACT SER QL LA: NEGATIVE
RYE IGE QN: NEGATIVE

## 2020-03-04 LAB — CCP IGA+IGG SERPL IA-ACNC: 11 UNITS (ref 0–19)

## 2020-08-02 DIAGNOSIS — T38.4X5D ORAL CONTRACEPTIVE CAUSING ADVERSE EFFECT IN THERAPEUTIC USE, SUBSEQUENT ENCOUNTER: ICD-10-CM

## 2020-08-03 RX ORDER — NORETHINDRONE ACETATE AND ETHINYL ESTRADIOL, ETHINYL ESTRADIOL AND FERROUS FUMARATE 1MG-10(24)
KIT ORAL
Qty: 28 TABLET | Refills: 4 | Status: SHIPPED | OUTPATIENT
Start: 2020-08-03 | End: 2021-09-22 | Stop reason: SDUPTHER

## 2020-11-20 ENCOUNTER — TELEPHONE (OUTPATIENT)
Dept: FAMILY MEDICINE CLINIC | Facility: CLINIC | Age: 20
End: 2020-11-20

## 2020-11-20 DIAGNOSIS — B34.9 VIRAL INFECTION, UNSPECIFIED: Primary | ICD-10-CM

## 2020-11-21 DIAGNOSIS — B34.9 VIRAL INFECTION, UNSPECIFIED: ICD-10-CM

## 2020-11-21 PROCEDURE — U0003 INFECTIOUS AGENT DETECTION BY NUCLEIC ACID (DNA OR RNA); SEVERE ACUTE RESPIRATORY SYNDROME CORONAVIRUS 2 (SARS-COV-2) (CORONAVIRUS DISEASE [COVID-19]), AMPLIFIED PROBE TECHNIQUE, MAKING USE OF HIGH THROUGHPUT TECHNOLOGIES AS DESCRIBED BY CMS-2020-01-R: HCPCS | Performed by: NURSE PRACTITIONER

## 2020-11-22 LAB — SARS-COV-2 RNA SPEC QL NAA+PROBE: NOT DETECTED

## 2020-11-23 ENCOUNTER — TELEPHONE (OUTPATIENT)
Dept: FAMILY MEDICINE CLINIC | Facility: CLINIC | Age: 20
End: 2020-11-23

## 2020-11-23 DIAGNOSIS — Z20.822 EXPOSURE TO COVID-19 VIRUS: Primary | ICD-10-CM

## 2020-11-26 LAB
SARS-COV-2 IGG SERPL QL IA: NEGATIVE
SARS-COV-2 IGG SERPL QL IA: NORMAL

## 2020-11-27 ENCOUNTER — TELEPHONE (OUTPATIENT)
Dept: FAMILY MEDICINE CLINIC | Facility: CLINIC | Age: 20
End: 2020-11-27

## 2020-11-27 DIAGNOSIS — Z20.822 EXPOSURE TO COVID-19 VIRUS: ICD-10-CM

## 2020-11-27 PROCEDURE — U0003 INFECTIOUS AGENT DETECTION BY NUCLEIC ACID (DNA OR RNA); SEVERE ACUTE RESPIRATORY SYNDROME CORONAVIRUS 2 (SARS-COV-2) (CORONAVIRUS DISEASE [COVID-19]), AMPLIFIED PROBE TECHNIQUE, MAKING USE OF HIGH THROUGHPUT TECHNOLOGIES AS DESCRIBED BY CMS-2020-01-R: HCPCS | Performed by: FAMILY MEDICINE

## 2020-11-28 LAB — SARS-COV-2 RNA SPEC QL NAA+PROBE: NOT DETECTED

## 2020-12-10 ENCOUNTER — TELEPHONE (OUTPATIENT)
Dept: FAMILY MEDICINE CLINIC | Facility: CLINIC | Age: 20
End: 2020-12-10

## 2020-12-11 ENCOUNTER — OFFICE VISIT (OUTPATIENT)
Dept: FAMILY MEDICINE CLINIC | Facility: CLINIC | Age: 20
End: 2020-12-11
Payer: COMMERCIAL

## 2020-12-11 VITALS
OXYGEN SATURATION: 96 % | WEIGHT: 128.6 LBS | SYSTOLIC BLOOD PRESSURE: 112 MMHG | RESPIRATION RATE: 14 BRPM | HEIGHT: 64 IN | HEART RATE: 100 BPM | TEMPERATURE: 99.6 F | DIASTOLIC BLOOD PRESSURE: 66 MMHG | BODY MASS INDEX: 21.95 KG/M2

## 2020-12-11 DIAGNOSIS — Z15.89 HUMAN LEUKOCYTE ANTIGEN B27 POSITIVE: ICD-10-CM

## 2020-12-11 DIAGNOSIS — R53.83 OTHER FATIGUE: ICD-10-CM

## 2020-12-11 DIAGNOSIS — M51.26 LUMBAR HERNIATED DISC: ICD-10-CM

## 2020-12-11 DIAGNOSIS — G89.29 CHRONIC BILATERAL LOW BACK PAIN WITHOUT SCIATICA: ICD-10-CM

## 2020-12-11 DIAGNOSIS — M54.50 CHRONIC BILATERAL LOW BACK PAIN WITHOUT SCIATICA: ICD-10-CM

## 2020-12-11 DIAGNOSIS — Z00.00 ANNUAL PHYSICAL EXAM: Primary | ICD-10-CM

## 2020-12-11 DIAGNOSIS — M25.50 ARTHRALGIA, UNSPECIFIED JOINT: ICD-10-CM

## 2020-12-11 DIAGNOSIS — M54.2 NECK PAIN: ICD-10-CM

## 2020-12-11 DIAGNOSIS — M51.47 SCHMORL'S LUMBOSACRAL NODES: ICD-10-CM

## 2020-12-11 PROBLEM — M46.1 INFLAMMATION OF SACROILIAC JOINT (HCC): Status: ACTIVE | Noted: 2020-12-11

## 2020-12-11 PROBLEM — M46.1 INFLAMMATION OF SACROILIAC JOINT (HCC): Status: RESOLVED | Noted: 2020-12-11 | Resolved: 2020-12-11

## 2020-12-11 PROCEDURE — 3008F BODY MASS INDEX DOCD: CPT | Performed by: NURSE PRACTITIONER

## 2020-12-11 PROCEDURE — 3725F SCREEN DEPRESSION PERFORMED: CPT | Performed by: NURSE PRACTITIONER

## 2020-12-11 PROCEDURE — 99395 PREV VISIT EST AGE 18-39: CPT | Performed by: NURSE PRACTITIONER

## 2020-12-11 PROCEDURE — 1036F TOBACCO NON-USER: CPT | Performed by: NURSE PRACTITIONER

## 2020-12-11 RX ORDER — TIZANIDINE 2 MG/1
TABLET ORAL
COMMUNITY
End: 2021-01-14

## 2020-12-11 RX ORDER — PREDNISONE 10 MG/1
50 TABLET ORAL DAILY
Qty: 25 TABLET | Refills: 0 | Status: SHIPPED | OUTPATIENT
Start: 2020-12-11 | End: 2020-12-16

## 2020-12-11 RX ORDER — DULOXETIN HYDROCHLORIDE 20 MG/1
20 CAPSULE, DELAYED RELEASE ORAL DAILY
Qty: 30 CAPSULE | Refills: 2 | Status: SHIPPED | OUTPATIENT
Start: 2020-12-11 | End: 2021-01-07

## 2021-01-06 ENCOUNTER — TELEPHONE (OUTPATIENT)
Dept: FAMILY MEDICINE CLINIC | Facility: CLINIC | Age: 21
End: 2021-01-06

## 2021-01-06 DIAGNOSIS — G89.29 CHRONIC BILATERAL LOW BACK PAIN WITH SCIATICA, SCIATICA LATERALITY UNSPECIFIED: Primary | ICD-10-CM

## 2021-01-06 DIAGNOSIS — M25.50 ARTHRALGIA, UNSPECIFIED JOINT: ICD-10-CM

## 2021-01-06 DIAGNOSIS — R53.83 OTHER FATIGUE: ICD-10-CM

## 2021-01-06 DIAGNOSIS — G89.29 CHRONIC BILATERAL LOW BACK PAIN WITHOUT SCIATICA: ICD-10-CM

## 2021-01-06 DIAGNOSIS — M54.40 CHRONIC BILATERAL LOW BACK PAIN WITH SCIATICA, SCIATICA LATERALITY UNSPECIFIED: Primary | ICD-10-CM

## 2021-01-06 DIAGNOSIS — M51.26 LUMBAR HERNIATED DISC: ICD-10-CM

## 2021-01-06 DIAGNOSIS — M54.50 CHRONIC BILATERAL LOW BACK PAIN WITHOUT SCIATICA: ICD-10-CM

## 2021-01-06 RX ORDER — PREDNISONE 10 MG/1
50 TABLET ORAL DAILY
Qty: 25 TABLET | Refills: 0 | Status: SHIPPED | OUTPATIENT
Start: 2021-01-06 | End: 2021-01-11

## 2021-01-06 NOTE — TELEPHONE ENCOUNTER
Katarina Pimentel has seen Lisa Caraballo in the past for back pain  Lisa Caraballo had prescribed a 5 day course of prednisone and Cymbalta for her  On Thursday she had bent over and has had pain back and spasms since then  Impacting her sleep and even brushing her teeth  Mom is asking if Lisa Caraballo would prescribe another dose of the prednisone  [facility] on [address]

## 2021-01-07 RX ORDER — DULOXETIN HYDROCHLORIDE 20 MG/1
CAPSULE, DELAYED RELEASE ORAL
Qty: 90 CAPSULE | Refills: 3 | Status: SHIPPED | OUTPATIENT
Start: 2021-01-07 | End: 2021-02-15

## 2021-01-11 ENCOUNTER — HOSPITAL ENCOUNTER (OUTPATIENT)
Dept: RADIOLOGY | Facility: HOSPITAL | Age: 21
Discharge: HOME | End: 2021-01-11
Payer: COMMERCIAL

## 2021-01-11 DIAGNOSIS — M54.2 NECK PAIN: ICD-10-CM

## 2021-01-11 PROCEDURE — 72050 X-RAY EXAM NECK SPINE 4/5VWS: CPT

## 2021-01-14 DIAGNOSIS — G89.29 CHRONIC BILATERAL LOW BACK PAIN WITHOUT SCIATICA: Primary | ICD-10-CM

## 2021-01-14 DIAGNOSIS — M54.50 CHRONIC BILATERAL LOW BACK PAIN WITHOUT SCIATICA: Primary | ICD-10-CM

## 2021-01-14 RX ORDER — CYCLOBENZAPRINE HCL 10 MG
10 TABLET ORAL 3 TIMES DAILY PRN
Qty: 30 TABLET | Refills: 0 | Status: SHIPPED | OUTPATIENT
Start: 2021-01-14 | End: 2021-02-15

## 2021-01-18 ENCOUNTER — TELEPHONE (OUTPATIENT)
Dept: PAIN MEDICINE | Facility: CLINIC | Age: 21
End: 2021-01-18

## 2021-02-15 ENCOUNTER — CONSULT (OUTPATIENT)
Dept: PAIN MEDICINE | Facility: CLINIC | Age: 21
End: 2021-02-15
Payer: COMMERCIAL

## 2021-02-15 ENCOUNTER — TRANSCRIBE ORDERS (OUTPATIENT)
Dept: PAIN MEDICINE | Facility: CLINIC | Age: 21
End: 2021-02-15

## 2021-02-15 VITALS
HEART RATE: 104 BPM | RESPIRATION RATE: 16 BRPM | HEIGHT: 64 IN | DIASTOLIC BLOOD PRESSURE: 79 MMHG | BODY MASS INDEX: 21.85 KG/M2 | SYSTOLIC BLOOD PRESSURE: 113 MMHG | WEIGHT: 128 LBS

## 2021-02-15 DIAGNOSIS — M79.7 FIBROMYALGIA: Primary | ICD-10-CM

## 2021-02-15 DIAGNOSIS — M54.50 CHRONIC BILATERAL LOW BACK PAIN WITHOUT SCIATICA: ICD-10-CM

## 2021-02-15 DIAGNOSIS — G89.29 CHRONIC BILATERAL LOW BACK PAIN WITHOUT SCIATICA: ICD-10-CM

## 2021-02-15 PROCEDURE — 99204 OFFICE O/P NEW MOD 45 MIN: CPT | Performed by: ANESTHESIOLOGY

## 2021-02-15 RX ORDER — PREGABALIN 50 MG/1
50 CAPSULE ORAL 2 TIMES DAILY
Qty: 60 CAPSULE | Refills: 1 | Status: SHIPPED | OUTPATIENT
Start: 2021-02-15 | End: 2021-03-10 | Stop reason: SDUPTHER

## 2021-02-15 NOTE — PROGRESS NOTES
Assessment  1  Fibromyalgia    2  Chronic bilateral low back pain without sciatica        Plan  The patient's symptoms, history / physical are consistent with pain that is multifactorial in origin  She is exhibiting symptoms consistent with fibromyalgia  At this time, I will start her on Lyrica 50 mg twice daily to help with the myofascial pain symptoms that she has been experiencing  She was apprised of the most common side effects including sleepiness and dizziness  She will call with an update in 2 weeks on how the medication is working  She was instructed to discontinue Cymbalta and Flexeril as well as meloxicam since she does not find to be particularly helpful  My impressions and treatment recommendations were discussed in detail with the patient who verbalized understanding and had no further questions  Discharge instructions were provided  I personally saw and examined the patient and I agree with the above discussed plan of care  No orders of the defined types were placed in this encounter  New Medications Ordered This Visit   Medications    pregabalin (LYRICA) 50 mg capsule     Sig: Take 1 capsule (50 mg total) by mouth 2 (two) times a day     Dispense:  60 capsule     Refill:  1       History of Present Illness    Gordon Steel is a 23 y o  female referred by [facility] who presents for consultation in regards to neck pain and lower back pain  Symptoms have been present for over 5 years  but became aggravated in November  Pain is moderate to severe rated 3-6/10 on numeric rating scale and felt nearly constantly but worse in the morning  Pain is described to be dull, aching, shooting and burning in the neck and shoulders as well as lower back  Symptoms are aggravated with standing, bending, exercise, coughing, sneezing and decreased with lying down relaxation  There is no change with bowel movements        Treatment history has included physical therapy in the past which has provided moderate relief  Heat/ ice has provided moderate relief  Acupuncture and chiropractic manipulations provided no relief  She has tried ibuprofen and meloxicam in the past with mild improvement  She is currently on cyclobenzaprin  Duloxetine and meloxicam with minimal relief  She has had extensive workup in the past for underlying rheumatologic conditions and is HLA B27 positive  I have personally reviewed and/or updated the patient's past medical history, past surgical history, family history, social history, current medications, allergies, and vital signs today  Review of Systems   Constitutional: Negative for fever and unexpected weight change  HENT: Negative for trouble swallowing  Eyes: Negative for visual disturbance  Respiratory: Negative for shortness of breath and wheezing  Cardiovascular: Negative for chest pain and palpitations  Gastrointestinal: Negative for constipation, diarrhea, nausea and vomiting  Endocrine: Negative for cold intolerance, heat intolerance and polydipsia  Genitourinary: Negative for difficulty urinating and frequency  Musculoskeletal: Positive for back pain, joint swelling, neck pain and neck stiffness  Negative for arthralgias, gait problem and myalgias  Skin: Negative for rash  Neurological: Negative for dizziness, seizures, syncope, weakness and headaches  Hematological: Does not bruise/bleed easily  Psychiatric/Behavioral: Negative for dysphoric mood  All other systems reviewed and are negative        Patient Active Problem List   Diagnosis    Eczema    Vitamin D deficiency    Chronic low back pain without sciatica    Arthralgia    Rash    Other fatigue    Human leukocyte antigen B27 positive       Past Medical History:   Diagnosis Date    Arthritis     in spine       Past Surgical History:   Procedure Laterality Date    NO PAST SURGERIES         Family History   Problem Relation Age of Onset    Arthritis Mother    Emre Torre Hyperlipidemia Father     Hypertension Father     Asthma Paternal Grandmother        Social History     Occupational History    Not on file   Tobacco Use    Smoking status: Never Smoker    Smokeless tobacco: Never Used   Substance and Sexual Activity    Alcohol use: No    Drug use: No    Sexual activity: Not on file       Current Outpatient Medications on File Prior to Visit   Medication Sig    Lo Loestrin Fe 1 MG-10 MCG / 10 MCG TABS TAKE 1 TABLET BY MOUTH EVERY DAY    [DISCONTINUED] cyclobenzaprine (FLEXERIL) 10 mg tablet Take 1 tablet (10 mg total) by mouth 3 (three) times a day as needed for muscle spasms    [DISCONTINUED] DULoxetine (CYMBALTA) 20 mg capsule TAKE 1 CAPSULE BY MOUTH EVERY DAY    Cholecalciferol 50 MCG (2000 UT) CAPS Take by mouth     No current facility-administered medications on file prior to visit  No Known Allergies    Physical Exam    /79   Pulse 104   Resp 16   Ht 5' 4" (1 626 m)   Wt 58 1 kg (128 lb)   BMI 21 97 kg/m²     Constitutional: normal, well developed, well nourished, alert, in no distress and non-toxic and no overt pain behavior    Eyes: anicteric  HEENT: grossly intact  Neck: supple, symmetric, trachea midline and no masses   Pulmonary:even and unlabored  Cardiovascular:No edema or pitting edema present  Skin:Normal without rashes or lesions and well hydrated  Psychiatric:Mood and affect appropriate  Neurologic:Cranial Nerves II-XII grossly intact  Musculoskeletal:Diffuse tenderness in the cervical paraspinal, bilateral trapezius, lumbar paraspinal and sacroiliac regions with light palpation    Imaging    MRI Lumbar Spine (1/11/2021)   mild degenerative disease at L1-2, L2-3 with subtle right paracentral disc protrusion L1-2 and mild facet arthropathy at L5-S1

## 2021-02-15 NOTE — PATIENT INSTRUCTIONS
Pregabalin (By mouth)   Pregabalin (pre-[id number])  Treats nerve and muscle pain, including fibromyalgia  Also treats partial-onset seizures  Brand Name(s): Lyrica, Lyrica CR   There may be other brand names for this medicine  When This Medicine Should Not Be Used: This medicine is not right for everyone  Do not use it if you had an allergic reaction to pregabalin  How to Use This Medicine:   Capsule, Liquid, Long Acting Tablet  · Take your medicine as directed  Your dose may need to be changed several times to find what works best for you  · Extended-release tablet: Swallow the extended-release tablet whole  Do not crush, break, or chew it  Take it after an evening meal   · Oral liquid: Measure the oral liquid medicine with a marked measuring spoon, oral syringe, or medicine cup  · This medicine should come with a Medication Guide  Ask your pharmacist for a copy if you do not have one  · Missed dose: Take a dose as soon as you remember  If it is almost time for your next dose, wait until then and take a regular dose  Do not take extra medicine to make up for a missed dose  If you miss a dose of the extended-release tablet after your evening meal, take it before bedtime after a snack  If you miss the dose before bedtime, take it after your morning meal  If you do not take the dose the following morning, then take the next dose at your regular time after your evening meal  Do not take 2 doses at the same time  · Store the medicine in a closed container at room temperature, away from heat, moisture, and direct light  Drugs and Foods to Avoid:   Ask your doctor or pharmacist before using any other medicine, including over-the-counter medicines, vitamins, and herbal products  · Some medicines can affect how pregabalin works  Tell your doctor if you are using any of the following:   ? ACE inhibitor (including benazepril, enalapril, lisinopril, quinapril, ramipril)  ?  Oral diabetes medicine (including metformin, pioglitazone, rosiglitazone)  · Do not drink alcohol while you are using this medicine  · Tell your doctor if you use anything else that makes you sleepy  Some examples are allergy medicine, narcotic pain medicine, and alcohol  Tell your doctor if you are also using oxycodone, lorazepam, or zolpidem  Warnings While Using This Medicine:   · Tell your doctor if you are pregnant or breastfeeding, or if you have kidney disease, heart failure, heart rhythm problems, lung or breathing problems, a bleeding disorder, diabetes, sores or skin problems, or a low blood platelet count  Tell your doctor if you have a history of angioedema (severe swelling), alcohol or drug abuse, depression, or other mood problems  · This medicine may cause the following problems:   ? Angioedema (severe swelling), which may be life-threatening  ? Changes in mood or behavior, including suicidal thoughts or behavior  ? Respiratory depression (serious breathing problem that can be life-threatening), when used with narcotic pain medicines  ? Peripheral edema (swelling of your hands, ankles, feet, or lower legs)  ? Increased risk for cancer and bleeding  ? Serious muscle problems  ? Heart rhythm changes  · This medicine may make you dizzy or drowsy  It may also cause blurry or double vision  Do not drive or do anything else that could be dangerous until you know how this medicine affects you  · Do not stop using this medicine suddenly  Your doctor will need to slowly decrease your dose before you stop it completely  · Your doctor will do lab tests at regular visits to check on the effects of this medicine  Keep all appointments  · Keep all medicine out of the reach of children  Never share your medicine with anyone    Possible Side Effects While Using This Medicine:   Call your doctor right away if you notice any of these side effects:  · Allergic reaction: Itching or hives, swelling in your face or hands, swelling or tingling in your mouth or throat, chest tightness, trouble breathing  · Blistering, peeling, red skin rash  · Blue lips, fingernails, or skin, trouble breathing, chest pain  · Blurry or double vision  · Fever, chills, cough, sore throat, body aches  · Muscle pain, tenderness, or weakness, general feeling of illness  · Rapid weight gain, swelling in your hands, ankles, or feet  · Severe dizziness or drowsiness  · Sudden mood changes, unusual moods or behavior, including extreme happiness or depression, thoughts or attempts of killing oneself  · Swelling in your throat, head, or neck  · Uneven heartbeat  · Unusual bleeding, bruising, or weakness  If you notice these less serious side effects, talk with your doctor:   · Confusion, trouble concentrating  · Constipation  · Dry mouth  If you notice other side effects that you think are caused by this medicine, tell your doctor  Call your doctor for medical advice about side effects  You may report side effects to FDA at 6-329-FDA-4989  © Copyright [address] Information is for End User's use only and may not be sold, redistributed or otherwise used for commercial purposes  The above information is an  only  It is not intended as medical advice for individual conditions or treatments  Talk to your doctor, nurse or pharmacist before following any medical regimen to see if it is safe and effective for you

## 2021-03-09 ENCOUNTER — TELEPHONE (OUTPATIENT)
Dept: RADIOLOGY | Facility: CLINIC | Age: 21
End: 2021-03-09

## 2021-03-10 DIAGNOSIS — M79.7 FIBROMYALGIA: ICD-10-CM

## 2021-03-10 RX ORDER — PREGABALIN 50 MG/1
50 CAPSULE ORAL 3 TIMES DAILY
Qty: 45 CAPSULE | Refills: 1 | Status: SHIPPED | OUTPATIENT
Start: 2021-03-10

## 2021-03-19 NOTE — PROGRESS NOTES
Assessment and Plan:   Patient is a 25-year-old female who presents for rheumatology consult regarding positive HLA B27 and chronic lower back pain  She has seen 2 previous rheumatologists who did not feel she had inflammatory disease associated with the B27  Discussion with her reveals chronic back pain over the last 5+ years with symptoms of morning stiffness and burning pain in the lower back  She has had extensive imaging workup done already with most recent SI joint MRI in July last year showing no evidence for sacroiliitis or ankylosing spondylitis  She did have MRI of her lumbar spine in January this year showing mild degenerative disc and facet changes but again no evidence for inflammatory spondylitis  We discussed that MRI of the best way to assess for an inflammatory spondylitis and since these were both unremarkable I do not feel she has clinical evidence of a disease associated with HLA B27  She is already following with pain management regarding the degenerative changes and suspected component of fibromyalgia  She does not have a lot of evidence in the office today for fibromyalgia on exam but also is on treatment with Lyrica at this time, and we discussed that this could be helping with some of that pain  She otherwise does not have evidence for an inflammatory peripheral arthritis, inflammatory bowel or eye disease, or skin psoriasis, that we would associate with the positive HLA B27  We did discuss that the best treatment for fibromyalgia pain would be exercise which she has recently started doing  She otherwise has had rheumatologic evaluation and tested negative for lupus, rheumatoid arthritis, and Sjogren syndrome  We discussed I do not think she needs additional workup or follow-up at this point with Rheumatology    We did discuss the typical features and signs and symptoms of clinical disease associated with positive HLA B27 and that she should return if any of those develop  Plan:  Diagnoses and all orders for this visit:    Chronic bilateral low back pain without sciatica  -     Ambulatory referral to Rheumatology    Human leukocyte antigen B27 positive  -     Ambulatory referral to Rheumatology    Other fatigue  -     Ambulatory referral to Rheumatology    Arthralgia, unspecified joint  -     Ambulatory referral to Rheumatology    Other orders  -     Multiple Vitamin (multivitamin) capsule; Take 1 capsule by mouth daily        Follow-up plan: no rheumatology follow-up needed      HPI  Pj Carias is a 23 y o   female who presents for rheumatology consult by request of Chadd Alston for +HLA-B27, chronic lower back pain  Patient previously saw Dr Linda Mckinney and more recently saw Dr Nate Kelly, records reviewed   -Eval by Dr Nate Kelly 7/2020 for +B27 and chronic back and joint pain, did not feel she had inflammatory arthritis or spondylitis, dxd with fibromyalgia; normal XRs of cervical, lumbar, and SI joints; previously also had MRI of SI joints in 2016 which was normal without sacroiliitis or AS  -Previously tried multiple meds for pain including naproxen, meloxicam, Celebrex, tizanidine, Robaxin, Flexeril; also tried PT, acupuncture, chiropractor, all without benefit  -MRI SI joints 7/28/2020: normal, no sacroiliitis or AS  -XR cervical spine 1/2021: normal, no signs of AS  -MRI lumbar spine 1/11/2021: mild DDD L1-2-3, mild facet arthropathy, no signs of AS  She recently saw pain management and they felt also that she had fibromyalgia and started her on Lyrica  She stopped Cymbalta, Flexeril and meloxicam     Patient reports she has been treated for chronic lower back pain for more than 5 years  She has chronic pain and gets "flare ups" of lower back pain  She feels worse in the winter and better in warmer months  She typically improves with a course of steroids, but this most recent flare didn't improve with steroids     She has morning stiffness and "burning" in her lower back, stiffness improves after 1 hour but the burning lasts all day  She doesn't have joint pain anywhere specific  Sometimes feels the urge to stretch her joints though  Her whole body feels "sore" and feels she needs to stretch all the time  Does notice some tenderness sometimes in her extremities with touching  She had a flaky rash at the back of her head, does have h/o eczema  No known history of inflammatory bowel or eye disease  Has h/o dry eyes, could not wear contacts because of it  Has had lasik  She is feeling better with lyrica, feels It helps with shooting pains and muscle spasms  But still having chronic back pain  NSAIDs and Tylenol have not helped in the past   Denies photosensitivity, psoriasis, oral or nasal ulcers, alopecia, Raynaud's, h/o pericarditis or pleurisy, h/o blood clots or miscarriages  Review of Systems  Review of Systems   Constitutional: Negative for chills, fatigue, fever and unexpected weight change  HENT: Negative for mouth sores and trouble swallowing  Eyes: Negative for pain and visual disturbance  Respiratory: Negative for cough and shortness of breath  Cardiovascular: Negative for chest pain and leg swelling  Gastrointestinal: Negative for abdominal pain, blood in stool, constipation, diarrhea and nausea  Musculoskeletal: Positive for back pain and myalgias  Negative for arthralgias and joint swelling  Skin: Negative for color change and rash  Neurological: Negative for weakness and numbness  Hematological: Negative for adenopathy  Psychiatric/Behavioral: Negative for sleep disturbance         Allergies  No Known Allergies     Home Medications    Current Outpatient Medications:     Cholecalciferol 50 MCG (2000 UT) CAPS, Take by mouth, Disp: , Rfl:     Lo Loestrin Fe 1 MG-10 MCG / 10 MCG TABS, TAKE 1 TABLET BY MOUTH EVERY DAY, Disp: 28 tablet, Rfl: 4    Multiple Vitamin (multivitamin) capsule, Take 1 capsule by mouth daily, Disp: , Rfl:     pregabalin (LYRICA) 50 mg capsule, Take 1 capsule (50 mg total) by mouth 3 (three) times a day, Disp: 45 capsule, Rfl: 1    Past Medical History  Past Medical History:   Diagnosis Date    Arthritis     in spine       Past Surgical History   Past Surgical History:   Procedure Laterality Date    NO PAST SURGERIES         Family History  No known family history of autoimmune or inflammatory diseases  Family History   Problem Relation Age of Onset    Arthritis Mother     Hyperlipidemia Father     Hypertension Father     Asthma Paternal Grandmother        Social History  Occupation: graduated from college, looking for job   Social History     Substance and Sexual Activity   Alcohol Use No     Social History     Substance and Sexual Activity   Drug Use No     Social History     Tobacco Use   Smoking Status Never Smoker   Smokeless Tobacco Never Used       Objective:    Vitals:    03/22/21 0838   Pulse: 98   Weight: 58 1 kg (128 lb)   Height: 5' 4" (1 626 m)       Physical Exam  Constitutional:       General: She is not in acute distress  Appearance: She is well-developed  HENT:      Head: Normocephalic and atraumatic  Eyes:      General: Lids are normal  No scleral icterus  Conjunctiva/sclera: Conjunctivae normal    Neck:      Musculoskeletal: Neck supple  Pulmonary:      Effort: Pulmonary effort is normal  No tachypnea, accessory muscle usage or respiratory distress  Musculoskeletal:      Comments: No joint swelling or synovitis anywhere  Some mild reproducible tenderness in the thighs, no other significant soft tissue tenderness today  Skin:     General: Skin is dry  Findings: No rash  Neurological:      Mental Status: She is alert  Psychiatric:         Behavior: Behavior normal  Behavior is cooperative  Imaging:   MRI SI joints 7/29/20:  IMPRESSION  No acute fracture or active or chronic sacroiliitis      XR left foot 2/2021:  Normal    MRI lumbar spine 1/11/2021: mild DDD L1-2-3, mild facet arthropathy, no signs of AS    XR cervical 1/11/21:    Images personally reviewed in PACS and my impression is:  Unremarkable cervical spine      Labs:    Ref Range & Units 7/8/20  4:21 PM   Sed Rate 0 - 20 mm/hr 8       Ref Range & Units 7/8/20  4:21 PM   C-Reactive Protein <7 0 mg/L <3 0    Comment:         Component      Latest Ref Rng & Units 2/29/2020   WBC      4 31 - 10 16 Thousand/uL 5 18   Red Blood Cell Count      3 81 - 5 12 Million/uL 4 51   Hemoglobin      11 5 - 15 4 g/dL 13 6   HCT      34 8 - 46 1 % 40 9   MCV      82 - 98 fL 91   MCH      26 8 - 34 3 pg 30 2   MCHC      31 4 - 37 4 g/dL 33 3   RDW      11 6 - 15 1 % 11 9   MPV      8 9 - 12 7 fL 9 7   Platelet Count      066 - 390 Thousands/uL 298   nRBC      /100 WBCs 0   Neutrophils %      43 - 75 % 49   Immat GRANS %      0 - 2 % 0   Lymphocytes Relative      14 - 44 % 38   Monocytes Relative      4 - 12 % 8   Eosinophils      0 - 6 % 4   Basophils Relative      0 - 1 % 1   Absolute Neutrophils      1 85 - 7 62 Thousands/µL 2 49   Immature Grans Absolute      0 00 - 0 20 Thousand/uL 0 01   Lymphocytes Absolute      0 60 - 4 47 Thousands/µL 1 98   Absolute Monocytes      0 17 - 1 22 Thousand/µL 0 43   Absolute Eosinophils      0 00 - 0 61 Thousand/µL 0 21   Basophils Absolute      0 00 - 0 10 Thousands/µL 0 06   Sodium      136 - 145 mmol/L 138   Potassium      3 5 - 5 3 mmol/L 3 6   Chloride      100 - 108 mmol/L 106   CO2      21 - 32 mmol/L 23   Anion Gap      4 - 13 mmol/L 9   BUN      5 - 25 mg/dL 16   Creatinine      0 60 - 1 30 mg/dL 0 87   GLUCOSE FASTING      65 - 99 mg/dL 87   Calcium      8 3 - 10 1 mg/dL 9 1   AST      5 - 45 U/L 18   ALT      12 - 78 U/L 16   Alkaline Phosphatase      46 - 116 U/L 48   Total Protein      6 4 - 8 2 g/dL 7 8   Albumin      3 5 - 5 0 g/dL 4 0   TOTAL BILIRUBIN      0 20 - 1 00 mg/dL 0 55   eGFR      ml/min/1 73sq m 96   TSH 3RD GENERATON      0 358 - 3 740 uIU/mL 1 980   C-REACTIVE PROTEIN      <3 0 mg/L 7 2 (H)   RHEUMATOID FACTOR      Negative Negative   CYCLIC CITRULLINATED PEPTIDE ANTIBODY      0 - 19 units 11   ANTI-NUCLEAR ANTIBODY (GRACIA)      Negative Negative   Lyme IgG/IgM Ab      0 00 - 0 90 ISR <0 91     Component      Latest Ref Rng & Units 5/3/2016   WBC      4 31 - 10 16 Thousand/uL 6 96   Red Blood Cell Count      3 81 - 5 12 Million/uL 4 94   Hemoglobin      11 5 - 15 4 g/dL 14 9   HCT      34 8 - 46 1 % 42 4   MCV      82 - 98 fL 86   MCH      26 8 - 34 3 pg 30 2   MCHC      31 4 - 37 4 g/dL 35 1   RDW      11 6 - 15 1 % 11 7   MPV      8 9 - 12 7 fL 9 1   Platelet Count      474 - 390 Thousands/uL 366   Neutrophils %      43 - 75 % 58   Lymphocytes Relative      14 - 44 % 31   Monocytes Relative      4 - 12 % 6   Eosinophils      0 - 6 % 4   Basophils Relative      0 - 1 % 1   Absolute Neutrophils      1 85 - 7 62 Thousands/µL 4 06   Lymphocytes Absolute      0 60 - 4 47 Thousands/µL 2 17   Absolute Monocytes      0 17 - 1 22 Thousand/µL 0 39   Absolute Eosinophils      0 00 - 0 61 Thousand/µL 0 28   Basophils Absolute      0 00 - 0 10 Thousands/µL 0 06   ANTI-NUCLEAR ANTIBODY (GRACIA)      Negative Negative   C3 COMPLEMENT      82 - 167 mg/dL 136   C-REACTIVE PROTEIN      <3 0 mg/L 3 2 (H)   HLA B27       Positive   Sed Rate      0 - 20 mm/hour 9   Sjogren's antibodies (SSA, SSB)   Negative    RHEUMATOID FACTOR      Negative Negative   Vit D, 25-Hydroxy      30 0 - 100 0 ng/mL 26 8 (L)

## 2021-03-22 ENCOUNTER — OFFICE VISIT (OUTPATIENT)
Dept: RHEUMATOLOGY | Facility: CLINIC | Age: 21
End: 2021-03-22
Payer: COMMERCIAL

## 2021-03-22 VITALS — HEART RATE: 98 BPM | BODY MASS INDEX: 21.85 KG/M2 | WEIGHT: 128 LBS | HEIGHT: 64 IN

## 2021-03-22 DIAGNOSIS — Z15.89 HUMAN LEUKOCYTE ANTIGEN B27 POSITIVE: ICD-10-CM

## 2021-03-22 DIAGNOSIS — M54.50 CHRONIC BILATERAL LOW BACK PAIN WITHOUT SCIATICA: ICD-10-CM

## 2021-03-22 DIAGNOSIS — G89.29 CHRONIC BILATERAL LOW BACK PAIN WITHOUT SCIATICA: ICD-10-CM

## 2021-03-22 DIAGNOSIS — M25.50 ARTHRALGIA, UNSPECIFIED JOINT: ICD-10-CM

## 2021-03-22 DIAGNOSIS — R53.83 OTHER FATIGUE: ICD-10-CM

## 2021-03-22 PROCEDURE — 3008F BODY MASS INDEX DOCD: CPT | Performed by: INTERNAL MEDICINE

## 2021-03-22 PROCEDURE — 99205 OFFICE O/P NEW HI 60 MIN: CPT | Performed by: INTERNAL MEDICINE

## 2021-03-22 RX ORDER — MULTIVITAMIN
1 CAPSULE ORAL DAILY
COMMUNITY

## 2021-03-25 DIAGNOSIS — M79.7 FIBROMYALGIA: ICD-10-CM

## 2021-03-25 RX ORDER — PREGABALIN 50 MG/1
50 CAPSULE ORAL 3 TIMES DAILY
Qty: 45 CAPSULE | Refills: 0 | Status: CANCELLED | OUTPATIENT
Start: 2021-03-25

## 2021-03-25 NOTE — TELEPHONE ENCOUNTER
S/w pharmacist at [facility], states script from 3/10 was received and picked up on 3/12  Pt does have one refill available, but should not be out of medication yet  LMOM for pt to cb  CB# and OH provided

## 2021-03-25 NOTE — TELEPHONE ENCOUNTER
Patient called returning the nurses call  Please be advise thank you        Please call patient back @   [phone]

## 2021-03-25 NOTE — TELEPHONE ENCOUNTER
S/w staff member at [facility], states pt is able to have refill picked up any time after 5pm tomorrow  S/w pt, advised of the same  Pt verbalized understanding and appreciation

## 2021-03-30 DIAGNOSIS — Z23 ENCOUNTER FOR IMMUNIZATION: ICD-10-CM

## 2021-04-08 ENCOUNTER — TELEPHONE (OUTPATIENT)
Dept: RADIOLOGY | Facility: CLINIC | Age: 21
End: 2021-04-08

## 2021-04-27 ENCOUNTER — TELEPHONE (OUTPATIENT)
Dept: RADIOLOGY | Facility: CLINIC | Age: 21
End: 2021-04-27

## 2021-04-27 DIAGNOSIS — M79.18 MYOFASCIAL MUSCLE PAIN: Primary | ICD-10-CM

## 2021-04-27 RX ORDER — METHOCARBAMOL 750 MG/1
750 TABLET, FILM COATED ORAL
Qty: 30 TABLET | Refills: 2 | Status: SHIPPED | OUTPATIENT
Start: 2021-04-27 | End: 2021-10-11 | Stop reason: SDUPTHER

## 2021-04-27 NOTE — TELEPHONE ENCOUNTER
----- Message from Torres Zamudio RN sent at 4/27/2021 11:23 AM EDT -----  Regarding: FW: Visit Follow-Up Question  Contact: [phone]    ----- Message -----  From: Michael Ward  Sent: 4/27/2021  11:21 AM EDT  To: [facility]  Subject: RE: Visit Dinorah Osorio,    After weaning off the Lyrica I am feeling some improvement with the fatigue, however I am experiencing more lower back spasms  Is it possible to start back up with a muscle relaxer at night or as needed? In the past I've tried tizanidine and cyclobenzaprine but since I've been dealing with fatigue I did some research and read that methocarbamol is apparently more tolerable in terms of drowsiness  Is that true and do you think it would be a more effective treatment for my symptoms? If not, I am willing to try anything else that you think will be effective       Thank you,  Michael Ward

## 2021-05-03 DIAGNOSIS — T75.3XXS MOTION SICKNESS, SEQUELA: Primary | ICD-10-CM

## 2021-05-03 DIAGNOSIS — R11.0 NAUSEA: ICD-10-CM

## 2021-05-03 RX ORDER — ONDANSETRON 4 MG/1
4 TABLET, FILM COATED ORAL EVERY 8 HOURS PRN
Qty: 20 TABLET | Refills: 0 | Status: SHIPPED | OUTPATIENT
Start: 2021-05-03

## 2021-05-03 RX ORDER — SCOLOPAMINE TRANSDERMAL SYSTEM 1 MG/1
1 PATCH, EXTENDED RELEASE TRANSDERMAL
Qty: 4 PATCH | Refills: 0 | Status: SHIPPED | OUTPATIENT
Start: 2021-05-03

## 2021-08-17 ENCOUNTER — TELEPHONE (OUTPATIENT)
Dept: FAMILY MEDICINE CLINIC | Facility: CLINIC | Age: 21
End: 2021-08-17

## 2021-08-17 ENCOUNTER — APPOINTMENT (OUTPATIENT)
Dept: LAB | Facility: CLINIC | Age: 21
End: 2021-08-17
Payer: COMMERCIAL

## 2021-08-17 DIAGNOSIS — R35.0 URINARY FREQUENCY: ICD-10-CM

## 2021-08-17 LAB
BILIRUB UR QL STRIP: NEGATIVE
CLARITY UR: CLEAR
COLOR UR: NORMAL
GLUCOSE UR STRIP-MCNC: NEGATIVE MG/DL
HGB UR QL STRIP.AUTO: NEGATIVE
KETONES UR STRIP-MCNC: NEGATIVE MG/DL
LEUKOCYTE ESTERASE UR QL STRIP: NEGATIVE
NITRITE UR QL STRIP: NEGATIVE
PH UR STRIP.AUTO: 6 [PH]
PROT UR STRIP-MCNC: NEGATIVE MG/DL
SP GR UR STRIP.AUTO: <=1.005 (ref 1–1.03)
UROBILINOGEN UR QL STRIP.AUTO: 0.2 E.U./DL

## 2021-08-17 PROCEDURE — 81003 URINALYSIS AUTO W/O SCOPE: CPT

## 2021-08-17 NOTE — TELEPHONE ENCOUNTER
Patient having abdominal cramping, but no other symptom  Mom is suspecting she may have a UTI  Mom would like to know if a urine test could be ordered to rule out UTI  Please advise      [address]

## 2021-09-22 DIAGNOSIS — T38.4X5D ORAL CONTRACEPTIVE CAUSING ADVERSE EFFECT IN THERAPEUTIC USE, SUBSEQUENT ENCOUNTER: ICD-10-CM

## 2021-09-22 RX ORDER — NORETHINDRONE ACETATE AND ETHINYL ESTRADIOL, ETHINYL ESTRADIOL AND FERROUS FUMARATE 1MG-10(24)
1 KIT ORAL DAILY
Qty: 28 TABLET | Refills: 4 | Status: SHIPPED | OUTPATIENT
Start: 2021-09-22 | End: 2022-01-28

## 2021-10-11 DIAGNOSIS — M79.18 MYOFASCIAL MUSCLE PAIN: ICD-10-CM

## 2021-10-11 RX ORDER — METHOCARBAMOL 750 MG/1
750 TABLET, FILM COATED ORAL
Qty: 30 TABLET | Refills: 2 | Status: SHIPPED | OUTPATIENT
Start: 2021-10-11 | End: 2022-01-09

## 2021-12-27 ENCOUNTER — TELEPHONE (OUTPATIENT)
Dept: FAMILY MEDICINE CLINIC | Facility: CLINIC | Age: 21
End: 2021-12-27

## 2021-12-27 DIAGNOSIS — Z20.822 EXPOSURE TO CONFIRMED CASE OF COVID-19: Primary | ICD-10-CM

## 2021-12-29 PROCEDURE — U0003 INFECTIOUS AGENT DETECTION BY NUCLEIC ACID (DNA OR RNA); SEVERE ACUTE RESPIRATORY SYNDROME CORONAVIRUS 2 (SARS-COV-2) (CORONAVIRUS DISEASE [COVID-19]), AMPLIFIED PROBE TECHNIQUE, MAKING USE OF HIGH THROUGHPUT TECHNOLOGIES AS DESCRIBED BY CMS-2020-01-R: HCPCS | Performed by: NURSE PRACTITIONER

## 2021-12-29 PROCEDURE — U0005 INFEC AGEN DETEC AMPLI PROBE: HCPCS | Performed by: NURSE PRACTITIONER

## 2022-01-28 DIAGNOSIS — T38.4X5D ORAL CONTRACEPTIVE CAUSING ADVERSE EFFECT IN THERAPEUTIC USE, SUBSEQUENT ENCOUNTER: ICD-10-CM

## 2022-01-28 RX ORDER — NORETHINDRONE ACETATE AND ETHINYL ESTRADIOL, ETHINYL ESTRADIOL AND FERROUS FUMARATE 1MG-10(24)
KIT ORAL
Qty: 84 TABLET | Refills: 1 | Status: SHIPPED | OUTPATIENT
Start: 2022-01-28